# Patient Record
Sex: FEMALE | Race: BLACK OR AFRICAN AMERICAN | NOT HISPANIC OR LATINO | Employment: FULL TIME | URBAN - METROPOLITAN AREA
[De-identification: names, ages, dates, MRNs, and addresses within clinical notes are randomized per-mention and may not be internally consistent; named-entity substitution may affect disease eponyms.]

---

## 2017-09-18 RX ORDER — NORGESTIMATE AND ETHINYL ESTRADIOL 7DAYSX3 28
KIT ORAL
Qty: 28 TABLET | Refills: 5 | Status: SHIPPED | OUTPATIENT
Start: 2017-09-18 | End: 2020-09-02 | Stop reason: SDUPTHER

## 2017-09-20 ENCOUNTER — NURSE TRIAGE (OUTPATIENT)
Dept: ADMINISTRATIVE | Facility: CLINIC | Age: 32
End: 2017-09-20

## 2017-09-20 NOTE — TELEPHONE ENCOUNTER
Reason for Disposition   Caller requesting a NON-URGENT new prescription or refill and triager unable to refill per unit policy    Protocols used: ST MEDICATION QUESTION CALL-A-AH    Pt is wanting a prescription for a something for her vaginal discharge. Advised that she needed to follow up OB/GYN

## 2017-11-28 RX ORDER — AZITHROMYCIN 250 MG/1
TABLET, FILM COATED ORAL
Qty: 6 TABLET | Refills: 0 | Status: SHIPPED | OUTPATIENT
Start: 2017-11-28 | End: 2018-11-05

## 2017-11-28 NOTE — TELEPHONE ENCOUNTER
----- Message from Jessica Guerra sent at 11/28/2017  9:23 AM CST -----  Contact: self   Patient want to know if you can give her a refill on zpack if so please send to [facility], any questions please call back at [phone] (home)       [facility]/pharmacy #[id number] - [place] - [address]  [address]  [place] [address]  Phone: [phone] Fax: [phone]

## 2018-11-05 ENCOUNTER — OFFICE VISIT (OUTPATIENT)
Dept: PRIMARY CARE CLINIC | Facility: CLINIC | Age: 33
End: 2018-11-05
Payer: COMMERCIAL

## 2018-11-05 VITALS
RESPIRATION RATE: 18 BRPM | HEIGHT: 67 IN | SYSTOLIC BLOOD PRESSURE: 125 MMHG | BODY MASS INDEX: 26.68 KG/M2 | OXYGEN SATURATION: 100 % | DIASTOLIC BLOOD PRESSURE: 84 MMHG | WEIGHT: 170 LBS | TEMPERATURE: 99 F | HEART RATE: 86 BPM

## 2018-11-05 DIAGNOSIS — J06.9 UPPER RESPIRATORY TRACT INFECTION, UNSPECIFIED TYPE: Primary | ICD-10-CM

## 2018-11-05 DIAGNOSIS — H61.22 IMPACTED CERUMEN OF LEFT EAR: ICD-10-CM

## 2018-11-05 PROCEDURE — 99213 OFFICE O/P EST LOW 20 MIN: CPT | Mod: S$GLB,,, | Performed by: FAMILY MEDICINE

## 2018-11-05 PROCEDURE — 99999 PR PBB SHADOW E&M-EST. PATIENT-LVL III: CPT | Mod: PBBFAC,,, | Performed by: FAMILY MEDICINE

## 2018-11-05 PROCEDURE — 3008F BODY MASS INDEX DOCD: CPT | Mod: CPTII,S$GLB,, | Performed by: FAMILY MEDICINE

## 2018-11-05 NOTE — PROGRESS NOTES
"Subjective:       Patient ID: Mya Mehta is a 33 y.o. female.    Chief Complaint: Otalgia (left ear pain x4 days ) and Cough (x2 weeks )    URI    This is a new problem. The current episode started 1 to 4 weeks ago (2 weeks). The problem has been gradually improving. There has been no fever. Associated symptoms include congestion, coughing, ear pain, headaches and a sore throat. Pertinent negatives include no rash, vomiting or wheezing.   Otalgia    There is pain in the left ear. This is a new problem. The current episode started in the past 7 days. The problem occurs constantly. The problem has been gradually improving. There has been no fever. Associated symptoms include coughing, headaches and a sore throat. Pertinent negatives include no ear discharge, hearing loss, rash or vomiting. Treatments tried: olive oil. The treatment provided mild relief. There is no history of a chronic ear infection, hearing loss or a tympanostomy tube.     Review of Systems   Constitutional: Negative for fever.   HENT: Positive for congestion, ear pain and sore throat. Negative for ear discharge and hearing loss.    Respiratory: Positive for cough. Negative for wheezing.    Gastrointestinal: Negative for vomiting.   Skin: Negative for rash.   Neurological: Positive for headaches.       Objective:      Vitals:    11/05/18 1013   BP: 125/84   BP Location: Left arm   Patient Position: Sitting   BP Method: Medium (Automatic)   Pulse: 86   Resp: 18   Temp: 98.7 °F (37.1 °C)   TempSrc: Oral   SpO2: 100%   Weight: 77.1 kg (170 lb)   Height: 5' 7" (1.702 m)     Physical Exam   Constitutional: She is oriented to person, place, and time. She appears well-developed and well-nourished.   HENT:   Head: Normocephalic and atraumatic.   Right Ear: Tympanic membrane normal.   Mouth/Throat: Oropharynx is clear and moist and mucous membranes are normal.   Cerumen impaction on left   Eyes: EOM are normal. Pupils are equal, round, and reactive to " light.   Neck: No JVD present.   Cardiovascular: Normal rate, regular rhythm and normal heart sounds.   Pulmonary/Chest: Effort normal and breath sounds normal.   Musculoskeletal: She exhibits no edema.   Neurological: She is alert and oriented to person, place, and time.   Skin: Skin is warm and dry.   Nursing note and vitals reviewed.      Assessment:       1. Upper respiratory tract infection, unspecified type    2. Impacted cerumen of left ear        Plan:       Upper respiratory tract infection, unspecified type  Comments:  Likely viral, treat symptomatically    Impacted cerumen of left ear  Comments:  Ear re-examined after irrigation, normal  Orders:  -     Ear wax removal         Medication List           Accurate as of 11/5/18 12:05 PM. If you have any questions, ask your nurse or doctor.               CONTINUE taking these medications    norgestimate-ethinyl estradiol 0.18/0.215/0.25 mg-35 mcg (28) tablet  Commonly known as:  ORTHO TRI-CYCLEN,TRI-SPRINTEC  TAKE 1 TABLET BY MOUTH EVERY DAY AS DIRECTED        STOP taking these medications    azithromycin 250 MG tablet  Commonly known as:  Z-NAVJOT  Stopped by:  Jaun Sánchez MD

## 2019-02-11 RX ORDER — OSELTAMIVIR PHOSPHATE 75 MG/1
75 CAPSULE ORAL DAILY
Qty: 10 CAPSULE | Refills: 0 | Status: SHIPPED | OUTPATIENT
Start: 2019-02-11 | End: 2019-02-21

## 2019-10-29 ENCOUNTER — OFFICE VISIT (OUTPATIENT)
Dept: PRIMARY CARE CLINIC | Facility: CLINIC | Age: 34
End: 2019-10-29
Payer: COMMERCIAL

## 2019-10-29 ENCOUNTER — CLINICAL SUPPORT (OUTPATIENT)
Dept: PRIMARY CARE CLINIC | Facility: CLINIC | Age: 34
End: 2019-10-29
Payer: COMMERCIAL

## 2019-10-29 VITALS
RESPIRATION RATE: 18 BRPM | SYSTOLIC BLOOD PRESSURE: 118 MMHG | WEIGHT: 168.88 LBS | OXYGEN SATURATION: 99 % | BODY MASS INDEX: 26.51 KG/M2 | DIASTOLIC BLOOD PRESSURE: 74 MMHG | TEMPERATURE: 99 F | HEIGHT: 67 IN | HEART RATE: 72 BPM

## 2019-10-29 DIAGNOSIS — J06.9 VIRAL URI WITH COUGH: Primary | ICD-10-CM

## 2019-10-29 LAB
BILIRUB SERPL-MCNC: ABNORMAL MG/DL
BLOOD URINE, POC: 250
COLOR, POC UA: YELLOW
GLUCOSE UR QL STRIP: ABNORMAL
KETONES UR QL STRIP: ABNORMAL
LEUKOCYTE ESTERASE URINE, POC: ABNORMAL
NITRITE, POC UA: ABNORMAL
PH, POC UA: 5
PROTEIN, POC: ABNORMAL
SPECIFIC GRAVITY, POC UA: 1.02
UROBILINOGEN, POC UA: ABNORMAL

## 2019-10-29 PROCEDURE — 99999 PR PBB SHADOW E&M-EST. PATIENT-LVL IV: CPT | Mod: PBBFAC,,, | Performed by: NURSE PRACTITIONER

## 2019-10-29 PROCEDURE — 99213 PR OFFICE/OUTPT VISIT, EST, LEVL III, 20-29 MIN: ICD-10-PCS | Mod: 25,S$GLB,, | Performed by: NURSE PRACTITIONER

## 2019-10-29 PROCEDURE — 87086 URINE CULTURE/COLONY COUNT: CPT

## 2019-10-29 PROCEDURE — 99999 PR PBB SHADOW E&M-EST. PATIENT-LVL IV: ICD-10-PCS | Mod: PBBFAC,,, | Performed by: NURSE PRACTITIONER

## 2019-10-29 PROCEDURE — 81002 URINALYSIS NONAUTO W/O SCOPE: CPT | Mod: S$GLB,,, | Performed by: NURSE PRACTITIONER

## 2019-10-29 PROCEDURE — 3008F BODY MASS INDEX DOCD: CPT | Mod: CPTII,S$GLB,, | Performed by: NURSE PRACTITIONER

## 2019-10-29 PROCEDURE — 81002 POCT URINE DIPSTICK WITHOUT MICROSCOPE: ICD-10-PCS | Mod: S$GLB,,, | Performed by: NURSE PRACTITIONER

## 2019-10-29 PROCEDURE — 99213 OFFICE O/P EST LOW 20 MIN: CPT | Mod: 25,S$GLB,, | Performed by: NURSE PRACTITIONER

## 2019-10-29 PROCEDURE — 3008F PR BODY MASS INDEX (BMI) DOCUMENTED: ICD-10-PCS | Mod: CPTII,S$GLB,, | Performed by: NURSE PRACTITIONER

## 2019-10-29 RX ORDER — PROMETHAZINE HYDROCHLORIDE AND DEXTROMETHORPHAN HYDROBROMIDE 6.25; 15 MG/5ML; MG/5ML
5 SYRUP ORAL
Qty: 118 ML | Refills: 0 | Status: SHIPPED | OUTPATIENT
Start: 2019-10-29 | End: 2019-11-20

## 2019-10-29 RX ORDER — ESTRADIOL 1 MG/1
1 TABLET ORAL 2 TIMES DAILY
Refills: 1 | COMMUNITY
Start: 2019-09-09 | End: 2020-01-13

## 2019-10-29 NOTE — PROGRESS NOTES
"Chief Complaint  Chief Complaint   Patient presents with    URI    Cough       HPI    Mya Mehta is a 34 y.o. female that presents for body aches.    Reports the onset of body aches approximately 2 days ago. Generalized body aches. Sore throat. No fever or chills. No headache. No n/v/d. Generalized weakness. Nasal congestion. Cough is non-productive. Intermittent "wheezing" at night during coughing spells. Taking halls, tea, theraflu, tylenol cold, mucinex.  Sick contacts including daughter, son, . Everyone was negative for the flu.       PAST MEDICAL HISTORY:  History reviewed. No pertinent past medical history.    PAST SURGICAL HISTORY:  History reviewed. No pertinent surgical history.    SOCIAL HISTORY:  Social History     Socioeconomic History    Marital status: Single     Spouse name: Not on file    Number of children: Not on file    Years of education: Not on file    Highest education level: Not on file   Occupational History    Not on file   Social Needs    Financial resource strain: Not on file    Food insecurity:     Worry: Not on file     Inability: Not on file    Transportation needs:     Medical: Not on file     Non-medical: Not on file   Tobacco Use    Smoking status: Never Smoker    Smokeless tobacco: Never Used   Substance and Sexual Activity    Alcohol use: No     Frequency: Never    Drug use: Not on file    Sexual activity: Not on file   Lifestyle    Physical activity:     Days per week: Not on file     Minutes per session: Not on file    Stress: Not on file   Relationships    Social connections:     Talks on phone: Not on file     Gets together: Not on file     Attends Evangelical service: Not on file     Active member of club or organization: Not on file     Attends meetings of clubs or organizations: Not on file     Relationship status: Not on file   Other Topics Concern    Not on file   Social History Narrative    Not on file       FAMILY HISTORY:  Family History " "  Problem Relation Age of Onset    Cancer Mother        ALLERGIES AND MEDICATIONS: updated and reviewed.  Review of patient's allergies indicates:  No Known Allergies  Current Outpatient Medications   Medication Sig Dispense Refill    estradiol (ESTRACE) 1 MG tablet Take 1 mg by mouth 2 (two) times daily.  1    norgestimate-ethinyl estradiol (ORTHO TRI-CYCLEN,TRI-SPRINTEC) 0.18/0.215/0.25 mg-35 mcg (28) tablet TAKE 1 TABLET BY MOUTH EVERY DAY AS DIRECTED 28 tablet 5    promethazine-dextromethorphan (PROMETHAZINE-DM) 6.25-15 mg/5 mL Syrp Take 5 mLs by mouth every 4 to 6 hours as needed. 118 mL 0     No current facility-administered medications for this visit.          ROS  Review of Systems   Constitutional: Positive for fatigue. Negative for chills and fever.   HENT: Positive for congestion, rhinorrhea and sore throat. Negative for ear discharge, ear pain, postnasal drip, sinus pressure and sinus pain.    Respiratory: Negative for cough, shortness of breath and wheezing.    Cardiovascular: Negative for chest pain and palpitations.   Gastrointestinal: Negative for abdominal pain, diarrhea, nausea and vomiting.   Musculoskeletal: Positive for myalgias.   Skin: Negative for rash.   Neurological: Positive for headaches.   Psychiatric/Behavioral: Positive for sleep disturbance.           PHYSICAL EXAM  Vitals:    10/29/19 1343   BP: 118/74   BP Location: Left arm   Patient Position: Sitting   BP Method: Medium (Manual)   Pulse: 72   Resp: 18   Temp: 98.5 °F (36.9 °C)   TempSrc: Oral   SpO2: 99%   Weight: 76.6 kg (168 lb 14.4 oz)   Height: 5' 7" (1.702 m)    Body mass index is 26.45 kg/m².  Weight: 76.6 kg (168 lb 14.4 oz)   Height: 5' 7" (170.2 cm)     Physical Exam   Constitutional: She appears well-developed and well-nourished.   HENT:   Head: Normocephalic.   Right Ear: Tympanic membrane normal.   Left Ear: Tympanic membrane normal.   Nose: Rhinorrhea present.   Mouth/Throat: Posterior oropharyngeal erythema " present. No tonsillar exudate.   Eyes: Pupils are equal, round, and reactive to light. Conjunctivae are normal.   Neck: Normal range of motion.   Cardiovascular: Normal rate, regular rhythm and normal heart sounds.   Pulses:       Radial pulses are 2+ on the right side, and 2+ on the left side.   Pulmonary/Chest: Effort normal. No accessory muscle usage. No respiratory distress. She has no wheezes. She has no rhonchi.   Abdominal: Soft. Bowel sounds are normal. She exhibits no distension. There is no tenderness.   Musculoskeletal: She exhibits no edema.   Lymphadenopathy:     She has no cervical adenopathy.   Neurological: She is alert.   Skin: Skin is warm and dry. No rash noted.         Health Maintenance       Date Due Completion Date    TETANUS VACCINE 02/20/2002 ---    Pap Smear with HPV Cotest 02/20/2005 ---    Influenza Vaccine (1) 09/01/2019 ---            Assessment & Plan    Problem List Items Addressed This Visit     None      Visit Diagnoses     Viral URI with cough    -  Primary    Relevant Medications    promethazine-dextromethorphan (PROMETHAZINE-DM) 6.25-15 mg/5 mL Syrp    Other Relevant Orders    POCT URINE DIPSTICK WITHOUT MICROSCOPE    X-Ray Chest PA And Lateral    CULTURE, URINE  Patient instructed to start abx with noted worsening of symptoms, the presence of fever, the persistence of symptoms for 8 total days.             Follow-up: No follow-ups on file.    Loretta Varela    Medication List with Changes/Refills   New Medications    PROMETHAZINE-DEXTROMETHORPHAN (PROMETHAZINE-DM) 6.25-15 MG/5 ML SYRP    Take 5 mLs by mouth every 4 to 6 hours as needed.   Current Medications    ESTRADIOL (ESTRACE) 1 MG TABLET    Take 1 mg by mouth 2 (two) times daily.    NORGESTIMATE-ETHINYL ESTRADIOL (ORTHO TRI-CYCLEN,TRI-SPRINTEC) 0.18/0.215/0.25 MG-35 MCG (28) TABLET    TAKE 1 TABLET BY MOUTH EVERY DAY AS DIRECTED

## 2019-10-29 NOTE — PATIENT INSTRUCTIONS
Patient instructed to start abx with noted worsening of symptoms, the presence of fever, the persistence of symptoms for 8 total days.

## 2019-10-31 LAB
BACTERIA UR CULT: NORMAL
BACTERIA UR CULT: NORMAL

## 2019-11-20 ENCOUNTER — OFFICE VISIT (OUTPATIENT)
Dept: PRIMARY CARE CLINIC | Facility: CLINIC | Age: 34
End: 2019-11-20
Payer: COMMERCIAL

## 2019-11-20 VITALS
RESPIRATION RATE: 19 BRPM | HEART RATE: 70 BPM | OXYGEN SATURATION: 100 % | TEMPERATURE: 98 F | SYSTOLIC BLOOD PRESSURE: 112 MMHG | WEIGHT: 167 LBS | DIASTOLIC BLOOD PRESSURE: 78 MMHG | BODY MASS INDEX: 26.21 KG/M2 | HEIGHT: 67 IN

## 2019-11-20 DIAGNOSIS — L73.9 FOLLICULITIS: Primary | ICD-10-CM

## 2019-11-20 PROCEDURE — 99999 PR PBB SHADOW E&M-EST. PATIENT-LVL IV: CPT | Mod: PBBFAC,,, | Performed by: NURSE PRACTITIONER

## 2019-11-20 PROCEDURE — 3008F BODY MASS INDEX DOCD: CPT | Mod: CPTII,S$GLB,, | Performed by: NURSE PRACTITIONER

## 2019-11-20 PROCEDURE — 99213 PR OFFICE/OUTPT VISIT, EST, LEVL III, 20-29 MIN: ICD-10-PCS | Mod: S$GLB,,, | Performed by: NURSE PRACTITIONER

## 2019-11-20 PROCEDURE — 3008F PR BODY MASS INDEX (BMI) DOCUMENTED: ICD-10-PCS | Mod: CPTII,S$GLB,, | Performed by: NURSE PRACTITIONER

## 2019-11-20 PROCEDURE — 99999 PR PBB SHADOW E&M-EST. PATIENT-LVL IV: ICD-10-PCS | Mod: PBBFAC,,, | Performed by: NURSE PRACTITIONER

## 2019-11-20 PROCEDURE — 99213 OFFICE O/P EST LOW 20 MIN: CPT | Mod: S$GLB,,, | Performed by: NURSE PRACTITIONER

## 2019-11-20 RX ORDER — DIAZEPAM 5 MG/1
5 TABLET ORAL EVERY 6 HOURS PRN
Qty: 30 TABLET | Refills: 0 | Status: SHIPPED | OUTPATIENT
Start: 2019-11-20 | End: 2020-01-13

## 2019-11-20 RX ORDER — ALPRAZOLAM 0.5 MG/1
0.5 TABLET ORAL 3 TIMES DAILY
Qty: 30 TABLET | Refills: 0 | Status: CANCELLED | OUTPATIENT
Start: 2019-11-20 | End: 2019-12-20

## 2019-11-20 NOTE — PROGRESS NOTES
Chief Complaint  Chief Complaint   Patient presents with    Follow-up     referral to derm. medication to fly       HPI    Mya Mehta is a 34 y.o. female that presents for rash.    Patient with a h/o abdominal and UE rash for several years. Appeared after the birth of her oldest daughter. Not worsening. Not painful. No itching. Worsening with menstrual period when it becomes a black head. Has seen dermatology in the past with skin biopsy and was told it was a sebaceous cyst and it needed to be burned off but requesting a second opinion.   Patient also reports increased anxiety related to flying and also reports irritability with driving in [place]. Requesting medication for treatment for the anxiety. Has taken valium in the past for dentistry anxiety with full relief.           PAST MEDICAL HISTORY:  History reviewed. No pertinent past medical history.    PAST SURGICAL HISTORY:  History reviewed. No pertinent surgical history.    SOCIAL HISTORY:  Social History     Socioeconomic History    Marital status: Single     Spouse name: Not on file    Number of children: Not on file    Years of education: Not on file    Highest education level: Not on file   Occupational History    Not on file   Social Needs    Financial resource strain: Not on file    Food insecurity:     Worry: Not on file     Inability: Not on file    Transportation needs:     Medical: Not on file     Non-medical: Not on file   Tobacco Use    Smoking status: Never Smoker    Smokeless tobacco: Never Used   Substance and Sexual Activity    Alcohol use: No     Frequency: Never    Drug use: Not on file    Sexual activity: Not on file   Lifestyle    Physical activity:     Days per week: Not on file     Minutes per session: Not on file    Stress: Not on file   Relationships    Social connections:     Talks on phone: Not on file     Gets together: Not on file     Attends Cheondoism service: Not on file     Active member of club or  "organization: Not on file     Attends meetings of clubs or organizations: Not on file     Relationship status: Not on file   Other Topics Concern    Not on file   Social History Narrative    Not on file       FAMILY HISTORY:  Family History   Problem Relation Age of Onset    Cancer Mother        ALLERGIES AND MEDICATIONS: updated and reviewed.  Review of patient's allergies indicates:  No Known Allergies  Current Outpatient Medications   Medication Sig Dispense Refill    estradiol (ESTRACE) 1 MG tablet Take 1 mg by mouth 2 (two) times daily.  1    norgestimate-ethinyl estradiol (ORTHO TRI-CYCLEN,TRI-SPRINTEC) 0.18/0.215/0.25 mg-35 mcg (28) tablet TAKE 1 TABLET BY MOUTH EVERY DAY AS DIRECTED 28 tablet 5    diazePAM (VALIUM) 5 MG tablet Take 1 tablet (5 mg total) by mouth every 6 (six) hours as needed for Anxiety. 30 tablet 0     No current facility-administered medications for this visit.          ROS  Review of Systems   Constitutional: Negative for chills and fever.   HENT: Negative for ear pain, postnasal drip and sinus pain.    Respiratory: Negative for cough and shortness of breath.    Cardiovascular: Negative for chest pain.   Gastrointestinal: Negative for diarrhea, nausea and vomiting.   Skin: Positive for rash.   Psychiatric/Behavioral: The patient is nervous/anxious.            PHYSICAL EXAM  Vitals:    11/20/19 1457   BP: 112/78   BP Location: Right arm   Patient Position: Sitting   BP Method: Medium (Manual)   Pulse: 70   Resp: 19   Temp: 98.1 °F (36.7 °C)   TempSrc: Oral   SpO2: 100%   Weight: 75.8 kg (167 lb)   Height: 5' 7" (1.702 m)    Body mass index is 26.16 kg/m².  Weight: 75.8 kg (167 lb)   Height: 5' 7" (170.2 cm)     Physical Exam   Constitutional: She is oriented to person, place, and time. She appears well-developed and well-nourished.   HENT:   Head: Normocephalic.   Right Ear: Tympanic membrane normal.   Left Ear: Tympanic membrane normal.   Mouth/Throat: Uvula is midline, oropharynx " is clear and moist and mucous membranes are normal.   Eyes: Conjunctivae are normal.   Cardiovascular: Normal rate, regular rhythm, normal heart sounds and normal pulses.   No murmur heard.  Pulses:       Radial pulses are 2+ on the right side, and 2+ on the left side.   No LE swelling noted   Pulmonary/Chest: Effort normal and breath sounds normal. She has no wheezes.   Abdominal: Soft. Bowel sounds are normal. There is no tenderness.   Musculoskeletal: She exhibits no edema.   Lymphadenopathy:     She has no cervical adenopathy.   Neurological: She is alert and oriented to person, place, and time.   Skin: Skin is warm and dry. Rash noted. Rash is pustular.   Generalized rash noted to right abdomen, bilateral upper extremities. Appear pustular, cystic with dark center. No swelling or erythema. Non-vesicular.   Psychiatric: She has a normal mood and affect.         Health Maintenance       Date Due Completion Date    TETANUS VACCINE 02/20/2002 ---    Pap Smear with HPV Cotest 02/20/2005 ---    Influenza Vaccine (1) 09/01/2019 ---            Assessment & Plan    Problem List Items Addressed This Visit     None      Visit Diagnoses     Folliculitis    -  Primary    Relevant Orders    Ambulatory referral to Dermatology          Follow-up: No follow-ups on file.    Loretta Varela    Medication List with Changes/Refills   New Medications    DIAZEPAM (VALIUM) 5 MG TABLET    Take 1 tablet (5 mg total) by mouth every 6 (six) hours as needed for Anxiety.   Current Medications    ESTRADIOL (ESTRACE) 1 MG TABLET    Take 1 mg by mouth 2 (two) times daily.    NORGESTIMATE-ETHINYL ESTRADIOL (ORTHO TRI-CYCLEN,TRI-SPRINTEC) 0.18/0.215/0.25 MG-35 MCG (28) TABLET    TAKE 1 TABLET BY MOUTH EVERY DAY AS DIRECTED   Discontinued Medications    PROMETHAZINE-DEXTROMETHORPHAN (PROMETHAZINE-DM) 6.25-15 MG/5 ML SYRP    Take 5 mLs by mouth every 4 to 6 hours as needed.

## 2020-01-13 ENCOUNTER — OFFICE VISIT (OUTPATIENT)
Dept: PRIMARY CARE CLINIC | Facility: CLINIC | Age: 35
End: 2020-01-13
Payer: COMMERCIAL

## 2020-01-13 VITALS
WEIGHT: 172.63 LBS | BODY MASS INDEX: 27.09 KG/M2 | OXYGEN SATURATION: 100 % | RESPIRATION RATE: 18 BRPM | HEIGHT: 67 IN | HEART RATE: 75 BPM | TEMPERATURE: 98 F | SYSTOLIC BLOOD PRESSURE: 100 MMHG | DIASTOLIC BLOOD PRESSURE: 82 MMHG

## 2020-01-13 DIAGNOSIS — R45.89 INEFFECTIVE COPING: Primary | ICD-10-CM

## 2020-01-13 DIAGNOSIS — F41.9 ANXIETY: ICD-10-CM

## 2020-01-13 PROCEDURE — 99213 PR OFFICE/OUTPT VISIT, EST, LEVL III, 20-29 MIN: ICD-10-PCS | Mod: S$GLB,,, | Performed by: NURSE PRACTITIONER

## 2020-01-13 PROCEDURE — 99999 PR PBB SHADOW E&M-EST. PATIENT-LVL IV: ICD-10-PCS | Mod: PBBFAC,,, | Performed by: NURSE PRACTITIONER

## 2020-01-13 PROCEDURE — 3008F BODY MASS INDEX DOCD: CPT | Mod: CPTII,S$GLB,, | Performed by: NURSE PRACTITIONER

## 2020-01-13 PROCEDURE — 3008F PR BODY MASS INDEX (BMI) DOCUMENTED: ICD-10-PCS | Mod: CPTII,S$GLB,, | Performed by: NURSE PRACTITIONER

## 2020-01-13 PROCEDURE — 99999 PR PBB SHADOW E&M-EST. PATIENT-LVL IV: CPT | Mod: PBBFAC,,, | Performed by: NURSE PRACTITIONER

## 2020-01-13 PROCEDURE — 99213 OFFICE O/P EST LOW 20 MIN: CPT | Mod: S$GLB,,, | Performed by: NURSE PRACTITIONER

## 2020-01-13 NOTE — PROGRESS NOTES
"Chief Complaint  Chief Complaint   Patient presents with    Headache    Stress       HPI    Mya Mehta is a 34 y.o. female that presents for headaches.    Patient with frequent headaches particularly at work. Reports "migraines" while looking at the computer. Also reports increased stressors at work which may be contributing to her headaches. Has been at recent job for 3 years with increased work load in addition to management issues. Feels generally fatigued, up all night doing work and sleeps 4-6 hours per night. Difficulty with initiating sleep when she finally does go to sleep. Episode of anxiety including tremors, headaches, chest tightness at her desk. Increased irritability. Intermittent dysphoria. Anhedonia. No regular exercise regimen. Good support system at home with  and family. No SI/HI. Not currently under the care of a therapist.           PAST MEDICAL HISTORY:  History reviewed. No pertinent past medical history.    PAST SURGICAL HISTORY:  History reviewed. No pertinent surgical history.    SOCIAL HISTORY:  Social History     Socioeconomic History    Marital status: Single     Spouse name: Not on file    Number of children: Not on file    Years of education: Not on file    Highest education level: Not on file   Occupational History    Not on file   Social Needs    Financial resource strain: Not on file    Food insecurity:     Worry: Not on file     Inability: Not on file    Transportation needs:     Medical: Not on file     Non-medical: Not on file   Tobacco Use    Smoking status: Never Smoker    Smokeless tobacco: Never Used   Substance and Sexual Activity    Alcohol use: No     Frequency: Never    Drug use: Not on file    Sexual activity: Not on file   Lifestyle    Physical activity:     Days per week: Not on file     Minutes per session: Not on file    Stress: Not on file   Relationships    Social connections:     Talks on phone: Not on file     Gets together: Not " "on file     Attends Anabaptism service: Not on file     Active member of club or organization: Not on file     Attends meetings of clubs or organizations: Not on file     Relationship status: Not on file   Other Topics Concern    Not on file   Social History Narrative    Not on file       FAMILY HISTORY:  Family History   Problem Relation Age of Onset    Cancer Mother        ALLERGIES AND MEDICATIONS: updated and reviewed.  Review of patient's allergies indicates:  No Known Allergies  Current Outpatient Medications   Medication Sig Dispense Refill    norgestimate-ethinyl estradiol (ORTHO TRI-CYCLEN,TRI-SPRINTEC) 0.18/0.215/0.25 mg-35 mcg (28) tablet TAKE 1 TABLET BY MOUTH EVERY DAY AS DIRECTED 28 tablet 5     No current facility-administered medications for this visit.          ROS  Review of Systems   Constitutional: Negative for chills and fever.   HENT: Negative for ear pain, postnasal drip and sinus pain.    Respiratory: Negative for cough and shortness of breath.    Cardiovascular: Negative for chest pain.   Gastrointestinal: Negative for diarrhea, nausea and vomiting.   Psychiatric/Behavioral: Positive for decreased concentration, dysphoric mood and sleep disturbance. The patient is nervous/anxious.            PHYSICAL EXAM  Vitals:    01/13/20 0832   BP: 100/82   BP Location: Right arm   Patient Position: Sitting   BP Method: Medium (Manual)   Pulse: 75   Resp: 18   Temp: 98.1 °F (36.7 °C)   TempSrc: Oral   SpO2: 100%   Weight: 78.3 kg (172 lb 9.9 oz)   Height: 5' 7" (1.702 m)    Body mass index is 27.04 kg/m².  Weight: 78.3 kg (172 lb 9.9 oz)   Height: 5' 7" (170.2 cm)     Physical Exam   Constitutional: She is oriented to person, place, and time. She appears well-developed and well-nourished.   HENT:   Head: Normocephalic.   Right Ear: Tympanic membrane normal.   Left Ear: Tympanic membrane normal.   Mouth/Throat: Uvula is midline, oropharynx is clear and moist and mucous membranes are normal.   Eyes: " Conjunctivae are normal.   Cardiovascular: Normal rate, regular rhythm, normal heart sounds and normal pulses.   No murmur heard.  Pulses:       Radial pulses are 2+ on the right side, and 2+ on the left side.   No LE swelling noted   Pulmonary/Chest: Effort normal and breath sounds normal. She has no wheezes.   Abdominal: Soft. Bowel sounds are normal. There is no tenderness.   Musculoskeletal: She exhibits no edema.   Lymphadenopathy:     She has no cervical adenopathy.   Neurological: She is alert and oriented to person, place, and time.   Skin: Skin is warm and dry. No rash noted.   Psychiatric: Her mood appears anxious. Her affect is labile.         Health Maintenance       Date Due Completion Date    TETANUS VACCINE 02/20/2002 ---    Pap Smear with HPV Cotest 02/20/2005 ---    Influenza Vaccine (1) 09/01/2019 ---            Assessment & Plan    Problem List Items Addressed This Visit     None      Visit Diagnoses     Ineffective coping    -  Primary    Relevant Orders    Ambulatory referral to Psychology    Anxiety        Relevant Orders    Ambulatory referral to Psychology    Patient educated on techniques to assist in reduction of anxiety including getting adequate sleep and getting regular exercise. Encouraged meditation, deep breathing exercises. Behavioral therapy offered and references given at this time. Coached patient regarding use of SSRI as standard of care in treatment of anxiety. Benzodiazepines to be used sparingly for break through episodes. Follow up in clinic for any worsening or persistent anxiety symptoms.     Stressed the importance of good sleep hygiene in assisting in alleviation of insomnia.  Avoid television, electronic devices for 2 hr prior to bedtime.  Try reading late at night.  No exercising after 3:00 p.m. or in the late evening.  Avoid caffeine after noon.  Good sleep hygiene includes going to sleep at the same time every night and wheezing at the same time every morning.             Follow-up: No follow-ups on file.    Loretta Varela    Medication List with Changes/Refills   Current Medications    NORGESTIMATE-ETHINYL ESTRADIOL (ORTHO TRI-CYCLEN,TRI-SPRINTEC) 0.18/0.215/0.25 MG-35 MCG (28) TABLET    TAKE 1 TABLET BY MOUTH EVERY DAY AS DIRECTED   Discontinued Medications    DIAZEPAM (VALIUM) 5 MG TABLET    Take 1 tablet (5 mg total) by mouth every 6 (six) hours as needed for Anxiety.    ESTRADIOL (ESTRACE) 1 MG TABLET    Take 1 mg by mouth 2 (two) times daily.

## 2020-01-13 NOTE — LETTER
January 13, 2020      [facility]  [address]  [facility] [address]  Phone: [phone]  Fax: [phone]       Patient: Mya Mehta   YOB: 1985  Date of Visit: 01/13/2020    To Whom It May Concern:    Lani Mehta  was at [facility] on 01/13/2020. She may return to work/school on 1/20/2019 with no restrictions. If you have any questions or concerns, or if I can be of further assistance, please do not hesitate to contact me.    Sincerely,          Josseline Hayes MA

## 2020-01-17 ENCOUNTER — TELEPHONE (OUTPATIENT)
Dept: PSYCHOLOGY | Facility: CLINIC | Age: 35
End: 2020-01-17

## 2020-01-17 ENCOUNTER — TELEPHONE (OUTPATIENT)
Dept: PRIMARY CARE CLINIC | Facility: CLINIC | Age: 35
End: 2020-01-17

## 2020-01-17 DIAGNOSIS — Z12.31 ENCOUNTER FOR SCREENING MAMMOGRAM FOR BREAST CANCER: Primary | ICD-10-CM

## 2020-01-17 DIAGNOSIS — Z80.3 FAMILY HISTORY OF BREAST CANCER: ICD-10-CM

## 2020-01-17 NOTE — TELEPHONE ENCOUNTER
----- Message from Mily Riley sent at 1/17/2020  2:19 PM CST -----  Contact: Patient [phone]  Requesting to speak with you regarding her anxiety and also would like an order for her mammogram.    Please call and advise.    Thank You

## 2020-01-17 NOTE — TELEPHONE ENCOUNTER
----- Message from Bryanna Newell sent at 1/17/2020 11:19 AM CST -----  Contact: Pt [phone]  Pt is trying to make an apt please call back

## 2020-01-20 ENCOUNTER — TELEPHONE (OUTPATIENT)
Dept: PRIMARY CARE CLINIC | Facility: CLINIC | Age: 35
End: 2020-01-20

## 2020-01-20 NOTE — LETTER
January 20, 2020      [facility]  [address], [facility] [address]  [facility] [address]  Phone: [phone]  Fax: [phone]       Patient: Mya Mehta   YOB: 1985  Date of Visit: 01/20/2020    To Whom It May Concern:    Lani Mehta  was at [facility] on 01/20/2020. She may return to work/school on 1/21/2020 with no restrictions. If you have any questions or concerns, or if I can be of further assistance, please do not hesitate to contact me.    Sincerely,    Josseline Hayes MA

## 2020-01-22 ENCOUNTER — TELEPHONE (OUTPATIENT)
Dept: PRIMARY CARE CLINIC | Facility: CLINIC | Age: 35
End: 2020-01-22

## 2020-01-22 NOTE — TELEPHONE ENCOUNTER
Attempted to contact patient, Vm left informing patient that mammo was ordered and to return call if she needed something else.

## 2020-01-22 NOTE — TELEPHONE ENCOUNTER
----- Message from Carmen Bañuelos LPN sent at 1/17/2020  2:34 PM CST -----  Contact: Patient [phone]      ----- Message -----  From: Mily Riley  Sent: 1/17/2020   2:19 PM CST  To: [facility] Staff    Requesting to speak with you regarding her anxiety and also would like an order for her mammogram.    Please call and advise.    Thank You

## 2020-01-22 NOTE — TELEPHONE ENCOUNTER
Please inquire as to how the patient is feeling and if she still needs to talk. If so, I will call her this afternoon. I have ordered her mammogram. Please schedule.

## 2020-04-29 ENCOUNTER — OFFICE VISIT (OUTPATIENT)
Dept: PRIMARY CARE CLINIC | Facility: CLINIC | Age: 35
End: 2020-04-29
Payer: COMMERCIAL

## 2020-04-29 DIAGNOSIS — Z91.199 NO-SHOW FOR APPOINTMENT: Primary | ICD-10-CM

## 2020-04-29 PROCEDURE — 99499 UNLISTED E&M SERVICE: CPT | Mod: 95,,, | Performed by: NURSE PRACTITIONER

## 2020-04-29 PROCEDURE — 99499 NO LOS: ICD-10-PCS | Mod: 95,,, | Performed by: NURSE PRACTITIONER

## 2020-04-29 NOTE — PROGRESS NOTES
Chief Complaint  Chief Complaint   Patient presents with    Anxiety       No show           PAST MEDICAL HISTORY:  History reviewed. No pertinent past medical history.    PAST SURGICAL HISTORY:  History reviewed. No pertinent surgical history.    SOCIAL HISTORY:  Social History     Socioeconomic History    Marital status: Single     Spouse name: Not on file    Number of children: Not on file    Years of education: Not on file    Highest education level: Not on file   Occupational History    Not on file   Social Needs    Financial resource strain: Not on file    Food insecurity:     Worry: Not on file     Inability: Not on file    Transportation needs:     Medical: Not on file     Non-medical: Not on file   Tobacco Use    Smoking status: Never Smoker    Smokeless tobacco: Never Used   Substance and Sexual Activity    Alcohol use: No     Frequency: Never    Drug use: Not on file    Sexual activity: Not on file   Lifestyle    Physical activity:     Days per week: Not on file     Minutes per session: Not on file    Stress: Not on file   Relationships    Social connections:     Talks on phone: Not on file     Gets together: Not on file     Attends Yazidi service: Not on file     Active member of club or organization: Not on file     Attends meetings of clubs or organizations: Not on file     Relationship status: Not on file   Other Topics Concern    Not on file   Social History Narrative    Not on file       FAMILY HISTORY:  Family History   Problem Relation Age of Onset    Cancer Mother        ALLERGIES AND MEDICATIONS: updated and reviewed.  Review of patient's allergies indicates:  No Known Allergies  Current Outpatient Medications   Medication Sig Dispense Refill    norgestimate-ethinyl estradiol (ORTHO TRI-CYCLEN,TRI-SPRINTEC) 0.18/0.215/0.25 mg-35 mcg (28) tablet TAKE 1 TABLET BY MOUTH EVERY DAY AS DIRECTED 28 tablet 5     No current facility-administered medications for this visit.           JAVIER  Review of Systems        PHYSICAL EXAM    Physical Exam      Health Maintenance       Date Due Completion Date    TETANUS VACCINE 02/20/2002 ---    Pap Smear with HPV Cotest 02/20/2005 ---    Influenza Vaccine (1) 09/01/2019 ---            Assessment & Plan    Problem List Items Addressed This Visit     None          Follow-up: No follow-ups on file.    Loretta Varela    Medication List with Changes/Refills   Current Medications    NORGESTIMATE-ETHINYL ESTRADIOL (ORTHO TRI-CYCLEN,TRI-SPRINTEC) 0.18/0.215/0.25 MG-35 MCG (28) TABLET    TAKE 1 TABLET BY MOUTH EVERY DAY AS DIRECTED

## 2020-09-02 NOTE — TELEPHONE ENCOUNTER
----- Message from Tabitha Dario sent at 9/2/2020 12:39 PM CDT -----  Contact: Patient  Type:  RX Refill Request    Who Called: Mya, patient  Refill or New Rx:  Refill  RX Name and Strength:  norgestimate-ethinyl estradiol (ORTHO TRI-CYCLEN,TRI-SPRINTEC) 0.18/0.215/0.25 mg-35 mcg (28) tablet  How is the patient currently taking it? (ex. 1XDay):  TAKE 1 TABLET BY MOUTH EVERY DAY AS DIRECTED  Is this a 30 day or 90 day RX:  30  Preferred Pharmacy with phone number:    [facility]/pharmacy #[id number] - [place], [place] - [address]  [address] [address]  Phone: [phone] Fax: [phone]  Local or Mail Order:  Local  Ordering Provider:  Loretta Varela NP  Would the patient rather a call back or a response via [facility]?   Phone call  Best Call Back Number:  [phone]  Additional Information: Please advise. Thanks.

## 2020-09-03 RX ORDER — NORGESTIMATE AND ETHINYL ESTRADIOL 7DAYSX3 28
1 KIT ORAL DAILY
Qty: 28 TABLET | Refills: 5 | Status: SHIPPED | OUTPATIENT
Start: 2020-09-03 | End: 2021-02-12

## 2020-11-05 ENCOUNTER — TELEPHONE (OUTPATIENT)
Dept: PRIMARY CARE CLINIC | Facility: CLINIC | Age: 35
End: 2020-11-05

## 2020-11-05 RX ORDER — DIAZEPAM 5 MG/1
5 TABLET ORAL DAILY PRN
Qty: 5 TABLET | Refills: 0 | Status: SHIPPED | OUTPATIENT
Start: 2020-11-05 | End: 2021-03-24 | Stop reason: SDUPTHER

## 2020-11-05 NOTE — TELEPHONE ENCOUNTER
----- Message from Tabitha Bishop sent at 11/5/2020  8:06 AM CST -----  Contact: Patient, [phone]  Requesting an RX refill or new RX.  Is this a refill or new RX: New  RX name and strength: Valium  Is this a 30 day or 90 day RX: ?  Pharmacy name and phone # (copy/paste from chart):    [id number] - [place], [place] - [address]  [address]  [place] [address] [address]  Phone: [phone] Fax: [phone]  Comments: Asking for a prescription, will be flying on an airplane today at 5:00pm. Please advise. Thanks.

## 2020-11-05 NOTE — TELEPHONE ENCOUNTER
The patient has not been seen in over 6 months.  Generally, I need to see my patients within 3 months for any controlled substances.  I will prescribe 5 Valium for the flight only.  Patient needs to follow-up after returning from her trip.

## 2020-11-17 ENCOUNTER — PATIENT MESSAGE (OUTPATIENT)
Dept: PRIMARY CARE CLINIC | Facility: CLINIC | Age: 35
End: 2020-11-17

## 2020-11-17 DIAGNOSIS — R43.9 PROBLEMS WITH SMELL AND TASTE: ICD-10-CM

## 2020-11-17 DIAGNOSIS — R51.9 HEAD ACHE: ICD-10-CM

## 2020-11-17 DIAGNOSIS — R05.9 COUGH: ICD-10-CM

## 2021-01-05 ENCOUNTER — PATIENT OUTREACH (OUTPATIENT)
Dept: ADMINISTRATIVE | Facility: HOSPITAL | Age: 36
End: 2021-01-05

## 2021-02-12 RX ORDER — NORGESTIMATE AND ETHINYL ESTRADIOL 7DAYSX3 28
KIT ORAL
Qty: 28 TABLET | Refills: 0 | Status: SHIPPED | OUTPATIENT
Start: 2021-02-12 | End: 2021-03-12

## 2021-03-12 RX ORDER — NORGESTIMATE AND ETHINYL ESTRADIOL 7DAYSX3 28
KIT ORAL
Qty: 28 TABLET | Refills: 0 | Status: SHIPPED | OUTPATIENT
Start: 2021-03-12 | End: 2021-03-31 | Stop reason: SDUPTHER

## 2021-03-22 ENCOUNTER — PATIENT OUTREACH (OUTPATIENT)
Dept: ADMINISTRATIVE | Facility: HOSPITAL | Age: 36
End: 2021-03-22

## 2021-03-24 ENCOUNTER — OFFICE VISIT (OUTPATIENT)
Dept: PRIMARY CARE CLINIC | Facility: CLINIC | Age: 36
End: 2021-03-24
Payer: COMMERCIAL

## 2021-03-24 VITALS
DIASTOLIC BLOOD PRESSURE: 84 MMHG | SYSTOLIC BLOOD PRESSURE: 102 MMHG | RESPIRATION RATE: 18 BRPM | BODY MASS INDEX: 27.09 KG/M2 | WEIGHT: 172.63 LBS | HEIGHT: 67 IN | OXYGEN SATURATION: 99 % | HEART RATE: 81 BPM

## 2021-03-24 DIAGNOSIS — Z12.4 ENCOUNTER FOR PAPANICOLAOU SMEAR FOR CERVICAL CANCER SCREENING: ICD-10-CM

## 2021-03-24 DIAGNOSIS — L70.0 CYSTIC ACNE: ICD-10-CM

## 2021-03-24 DIAGNOSIS — Z01.419 WELL WOMAN EXAM WITH ROUTINE GYNECOLOGICAL EXAM: Primary | ICD-10-CM

## 2021-03-24 PROCEDURE — 99999 PR PBB SHADOW E&M-EST. PATIENT-LVL III: ICD-10-PCS | Mod: PBBFAC,,, | Performed by: NURSE PRACTITIONER

## 2021-03-24 PROCEDURE — 1126F AMNT PAIN NOTED NONE PRSNT: CPT | Mod: S$GLB,,, | Performed by: NURSE PRACTITIONER

## 2021-03-24 PROCEDURE — 1126F PR PAIN SEVERITY QUANTIFIED, NO PAIN PRESENT: ICD-10-PCS | Mod: S$GLB,,, | Performed by: NURSE PRACTITIONER

## 2021-03-24 PROCEDURE — 87624 HPV HI-RISK TYP POOLED RSLT: CPT | Performed by: NURSE PRACTITIONER

## 2021-03-24 PROCEDURE — 99395 PR PREVENTIVE VISIT,EST,18-39: ICD-10-PCS | Mod: S$GLB,,, | Performed by: NURSE PRACTITIONER

## 2021-03-24 PROCEDURE — 99395 PREV VISIT EST AGE 18-39: CPT | Mod: S$GLB,,, | Performed by: NURSE PRACTITIONER

## 2021-03-24 PROCEDURE — 3008F PR BODY MASS INDEX (BMI) DOCUMENTED: ICD-10-PCS | Mod: CPTII,S$GLB,, | Performed by: NURSE PRACTITIONER

## 2021-03-24 PROCEDURE — 3008F BODY MASS INDEX DOCD: CPT | Mod: CPTII,S$GLB,, | Performed by: NURSE PRACTITIONER

## 2021-03-24 PROCEDURE — 99999 PR PBB SHADOW E&M-EST. PATIENT-LVL III: CPT | Mod: PBBFAC,,, | Performed by: NURSE PRACTITIONER

## 2021-03-24 RX ORDER — DIAZEPAM 5 MG/1
5 TABLET ORAL DAILY PRN
Qty: 5 TABLET | Refills: 0 | Status: SHIPPED | OUTPATIENT
Start: 2021-03-24 | End: 2021-03-31 | Stop reason: SDUPTHER

## 2021-03-29 ENCOUNTER — PATIENT OUTREACH (OUTPATIENT)
Dept: ADMINISTRATIVE | Facility: OTHER | Age: 36
End: 2021-03-29

## 2021-03-30 ENCOUNTER — TELEPHONE (OUTPATIENT)
Dept: DERMATOLOGY | Facility: CLINIC | Age: 36
End: 2021-03-30

## 2021-03-30 LAB
FINAL PATHOLOGIC DIAGNOSIS: NORMAL
Lab: NORMAL

## 2021-03-31 ENCOUNTER — LAB VISIT (OUTPATIENT)
Dept: LAB | Facility: HOSPITAL | Age: 36
End: 2021-03-31
Attending: DERMATOLOGY
Payer: COMMERCIAL

## 2021-03-31 ENCOUNTER — PATIENT MESSAGE (OUTPATIENT)
Dept: PRIMARY CARE CLINIC | Facility: CLINIC | Age: 36
End: 2021-03-31

## 2021-03-31 ENCOUNTER — OFFICE VISIT (OUTPATIENT)
Dept: DERMATOLOGY | Facility: CLINIC | Age: 36
End: 2021-03-31
Payer: COMMERCIAL

## 2021-03-31 DIAGNOSIS — L70.0 ACNE VULGARIS: Primary | ICD-10-CM

## 2021-03-31 DIAGNOSIS — L72.2 STEATOCYSTOMA MULTIPLEX: ICD-10-CM

## 2021-03-31 DIAGNOSIS — Z51.81 MEDICATION MONITORING ENCOUNTER: ICD-10-CM

## 2021-03-31 LAB
ALBUMIN SERPL BCP-MCNC: 3.2 G/DL (ref 3.5–5.2)
ALP SERPL-CCNC: 66 U/L (ref 55–135)
ALT SERPL W/O P-5'-P-CCNC: 19 U/L (ref 10–44)
AST SERPL-CCNC: 25 U/L (ref 10–40)
BILIRUB DIRECT SERPL-MCNC: 0.2 MG/DL (ref 0.1–0.3)
BILIRUB SERPL-MCNC: 0.3 MG/DL (ref 0.1–1)
CHOLEST SERPL-MCNC: 183 MG/DL (ref 120–199)
CHOLEST/HDLC SERPL: 2.1 {RATIO} (ref 2–5)
HCG INTACT+B SERPL-ACNC: <1.2 MIU/ML
HDLC SERPL-MCNC: 86 MG/DL (ref 40–75)
HDLC SERPL: 47 % (ref 20–50)
HPV HR 12 DNA SPEC QL NAA+PROBE: NEGATIVE
HPV16 AG SPEC QL: NEGATIVE
HPV18 DNA SPEC QL NAA+PROBE: NEGATIVE
LDLC SERPL CALC-MCNC: 81.6 MG/DL (ref 63–159)
NONHDLC SERPL-MCNC: 97 MG/DL
PROT SERPL-MCNC: 6.6 G/DL (ref 6–8.4)
TRIGL SERPL-MCNC: 77 MG/DL (ref 30–150)

## 2021-03-31 PROCEDURE — 99204 OFFICE O/P NEW MOD 45 MIN: CPT | Mod: 25,S$GLB,, | Performed by: DERMATOLOGY

## 2021-03-31 PROCEDURE — 99204 PR OFFICE/OUTPT VISIT, NEW, LEVL IV, 45-59 MIN: ICD-10-PCS | Mod: 25,S$GLB,, | Performed by: DERMATOLOGY

## 2021-03-31 PROCEDURE — 84702 CHORIONIC GONADOTROPIN TEST: CPT | Performed by: DERMATOLOGY

## 2021-03-31 PROCEDURE — 10060 PR DRAIN SKIN ABSCESS SIMPLE: ICD-10-PCS | Mod: S$GLB,,, | Performed by: DERMATOLOGY

## 2021-03-31 PROCEDURE — 1126F AMNT PAIN NOTED NONE PRSNT: CPT | Mod: S$GLB,,, | Performed by: DERMATOLOGY

## 2021-03-31 PROCEDURE — 10060 I&D ABSCESS SIMPLE/SINGLE: CPT | Mod: S$GLB,,, | Performed by: DERMATOLOGY

## 2021-03-31 PROCEDURE — 99999 PR PBB SHADOW E&M-EST. PATIENT-LVL III: CPT | Mod: PBBFAC,,, | Performed by: DERMATOLOGY

## 2021-03-31 PROCEDURE — 80076 HEPATIC FUNCTION PANEL: CPT | Performed by: DERMATOLOGY

## 2021-03-31 PROCEDURE — 80061 LIPID PANEL: CPT | Performed by: DERMATOLOGY

## 2021-03-31 PROCEDURE — 1126F PR PAIN SEVERITY QUANTIFIED, NO PAIN PRESENT: ICD-10-PCS | Mod: S$GLB,,, | Performed by: DERMATOLOGY

## 2021-03-31 PROCEDURE — 99999 PR PBB SHADOW E&M-EST. PATIENT-LVL III: ICD-10-PCS | Mod: PBBFAC,,, | Performed by: DERMATOLOGY

## 2021-03-31 PROCEDURE — 36415 COLL VENOUS BLD VENIPUNCTURE: CPT | Performed by: DERMATOLOGY

## 2021-03-31 RX ORDER — ADAPALENE GEL USP, 0.3% 3 MG/G
GEL TOPICAL NIGHTLY
Qty: 45 G | Refills: 5 | Status: SHIPPED | OUTPATIENT
Start: 2021-03-31

## 2021-03-31 RX ORDER — DIAZEPAM 5 MG/1
5 TABLET ORAL DAILY PRN
Qty: 5 TABLET | Refills: 0 | Status: SHIPPED | OUTPATIENT
Start: 2021-03-31 | End: 2022-06-30 | Stop reason: SDUPTHER

## 2021-03-31 RX ORDER — NORGESTIMATE AND ETHINYL ESTRADIOL 7DAYSX3 28
1 KIT ORAL DAILY
Qty: 28 TABLET | Refills: 11 | Status: SHIPPED | OUTPATIENT
Start: 2021-03-31 | End: 2022-01-03

## 2021-04-13 ENCOUNTER — PATIENT MESSAGE (OUTPATIENT)
Dept: PRIMARY CARE CLINIC | Facility: CLINIC | Age: 36
End: 2021-04-13

## 2021-04-16 ENCOUNTER — PATIENT MESSAGE (OUTPATIENT)
Dept: RESEARCH | Facility: HOSPITAL | Age: 36
End: 2021-04-16

## 2021-04-27 ENCOUNTER — TELEPHONE (OUTPATIENT)
Dept: PRIMARY CARE CLINIC | Facility: CLINIC | Age: 36
End: 2021-04-27

## 2021-04-27 DIAGNOSIS — Z20.822 ENCOUNTER FOR LABORATORY TESTING FOR COVID-19 VIRUS: ICD-10-CM

## 2021-11-30 DIAGNOSIS — J02.0 STREP THROAT: Primary | ICD-10-CM

## 2021-11-30 RX ORDER — CEFDINIR 300 MG/1
300 CAPSULE ORAL 2 TIMES DAILY
Qty: 10 CAPSULE | Refills: 0 | Status: SHIPPED | OUTPATIENT
Start: 2021-11-30 | End: 2021-12-05

## 2022-02-21 ENCOUNTER — TELEPHONE (OUTPATIENT)
Dept: DERMATOLOGY | Facility: CLINIC | Age: 37
End: 2022-02-21
Payer: COMMERCIAL

## 2022-04-20 ENCOUNTER — TELEPHONE (OUTPATIENT)
Dept: PRIMARY CARE CLINIC | Facility: CLINIC | Age: 37
End: 2022-04-20
Payer: COMMERCIAL

## 2022-04-20 RX ORDER — FLUCONAZOLE 150 MG/1
150 TABLET ORAL DAILY
Qty: 1 TABLET | Refills: 0 | Status: SHIPPED | OUTPATIENT
Start: 2022-04-20 | End: 2022-04-21

## 2022-05-09 ENCOUNTER — TELEPHONE (OUTPATIENT)
Dept: PRIMARY CARE CLINIC | Facility: CLINIC | Age: 37
End: 2022-05-09
Payer: COMMERCIAL

## 2022-05-09 NOTE — TELEPHONE ENCOUNTER
----- Message from Suman Mckeon sent at 5/9/2022  2:24 PM CDT -----  Contact: pt  Patient is returning a phone call.  Who left a message for the patient: Columba Franco  Does patient know what this is regarding:  yes  Would you like a call back, or a response through your [facility] portal?:  call  Comments:

## 2022-05-09 NOTE — TELEPHONE ENCOUNTER
----- Message from Tabitha Bishop sent at 5/9/2022 10:37 AM CDT -----  Contact: Patient, [phone]  Calling to speak with the nurse regarding her appointment she missed this morning. Please call her. Thanks.

## 2022-05-11 ENCOUNTER — OFFICE VISIT (OUTPATIENT)
Dept: PRIMARY CARE CLINIC | Facility: CLINIC | Age: 37
End: 2022-05-11
Payer: COMMERCIAL

## 2022-05-11 VITALS
OXYGEN SATURATION: 99 % | WEIGHT: 161.06 LBS | HEART RATE: 89 BPM | BODY MASS INDEX: 25.28 KG/M2 | RESPIRATION RATE: 16 BRPM | SYSTOLIC BLOOD PRESSURE: 104 MMHG | HEIGHT: 67 IN | DIASTOLIC BLOOD PRESSURE: 68 MMHG | TEMPERATURE: 98 F

## 2022-05-11 DIAGNOSIS — N76.0 ACUTE VAGINITIS: Primary | ICD-10-CM

## 2022-05-11 PROCEDURE — 99213 PR OFFICE/OUTPT VISIT, EST, LEVL III, 20-29 MIN: ICD-10-PCS | Mod: S$GLB,,, | Performed by: NURSE PRACTITIONER

## 2022-05-11 PROCEDURE — 3078F PR MOST RECENT DIASTOLIC BLOOD PRESSURE < 80 MM HG: ICD-10-PCS | Mod: CPTII,S$GLB,, | Performed by: NURSE PRACTITIONER

## 2022-05-11 PROCEDURE — 99999 PR PBB SHADOW E&M-EST. PATIENT-LVL III: CPT | Mod: PBBFAC,,, | Performed by: NURSE PRACTITIONER

## 2022-05-11 PROCEDURE — 3008F BODY MASS INDEX DOCD: CPT | Mod: CPTII,S$GLB,, | Performed by: NURSE PRACTITIONER

## 2022-05-11 PROCEDURE — 3008F PR BODY MASS INDEX (BMI) DOCUMENTED: ICD-10-PCS | Mod: CPTII,S$GLB,, | Performed by: NURSE PRACTITIONER

## 2022-05-11 PROCEDURE — 3074F SYST BP LT 130 MM HG: CPT | Mod: CPTII,S$GLB,, | Performed by: NURSE PRACTITIONER

## 2022-05-11 PROCEDURE — 99213 OFFICE O/P EST LOW 20 MIN: CPT | Mod: S$GLB,,, | Performed by: NURSE PRACTITIONER

## 2022-05-11 PROCEDURE — 3074F PR MOST RECENT SYSTOLIC BLOOD PRESSURE < 130 MM HG: ICD-10-PCS | Mod: CPTII,S$GLB,, | Performed by: NURSE PRACTITIONER

## 2022-05-11 PROCEDURE — 3078F DIAST BP <80 MM HG: CPT | Mod: CPTII,S$GLB,, | Performed by: NURSE PRACTITIONER

## 2022-05-11 PROCEDURE — 99999 PR PBB SHADOW E&M-EST. PATIENT-LVL III: ICD-10-PCS | Mod: PBBFAC,,, | Performed by: NURSE PRACTITIONER

## 2022-05-11 RX ORDER — FLUCONAZOLE 150 MG/1
150 TABLET ORAL
Qty: 2 TABLET | Refills: 0 | Status: SHIPPED | OUTPATIENT
Start: 2022-05-11 | End: 2022-05-15

## 2022-05-11 NOTE — PROGRESS NOTES
Chief Complaint  Chief Complaint   Patient presents with    Vaginitis       HPI    Patient with a h/o recurrent bacterial vaginosis and recurrent yeast vaginitis reports that she got up a brazilian bikini wax recently and exfoliated with an elderberry scrub after the wax at the recommendation of the  as a result she has suffered from an itchy, erythematous, dry rash on the outside her vagina bilateral vulva.  No vaginal discharge.  No vaginal itching.  Only external itching has been at treating with Monistat intravaginally for proximally 2 days which has helped.  Patient with history of recurrent yeast vaginitis in the past x2 episodes.  Previously result to the use of Diflucan.  Also history of bacterial vaginosis has been chronically on metronidazole gel after her menstrual period.  Denies any vaginal discharge at this time.  No recent episodes of bacterial vaginosis.  Patient does not douche.  No vaginal washes.  Does take hot baths and uses bubble baths and explore way ends.  Works out daily.  She showers immediately after working out.  No history of diabetes.        PAST MEDICAL HISTORY:  No past medical history on file.    PAST SURGICAL HISTORY:  No past surgical history on file.    SOCIAL HISTORY:  Social History     Socioeconomic History    Marital status: Single   Tobacco Use    Smoking status: Never Smoker    Smokeless tobacco: Never Used   Substance and Sexual Activity    Alcohol use: No       FAMILY HISTORY:  Family History   Problem Relation Age of Onset    Cancer Mother        ALLERGIES AND MEDICATIONS: updated and reviewed.  Review of patient's allergies indicates:  No Known Allergies  Current Outpatient Medications   Medication Sig Dispense Refill    adapalene 0.3 % gel Apply topically every evening. Apply thin layer to all affected areas nightly. Start slow, up titrate as tolerated. 45 g 5    diazePAM (VALIUM) 5 MG tablet Take 1 tablet (5 mg total) by mouth daily as needed for  "Anxiety. 5 tablet 0    fluconazole (DIFLUCAN) 150 MG Tab Take 1 tablet (150 mg total) by mouth Every 3 (three) days. for 2 doses 2 tablet 0     No current facility-administered medications for this visit.         ROS  Review of Systems   Constitutional: Negative for chills and fever.   HENT: Negative for ear pain, postnasal drip and sinus pain.    Respiratory: Negative for cough and shortness of breath.    Cardiovascular: Negative for chest pain.   Gastrointestinal: Negative for diarrhea, nausea and vomiting.   Genitourinary: Positive for vaginal pain (Vaginal itching). Negative for vaginal discharge.   Skin: Positive for rash (Vulvar rash).           PHYSICAL EXAM  Vitals:    05/11/22 1532   BP: 104/68   BP Location: Left arm   Patient Position: Sitting   BP Method: Medium (Manual)   Pulse: 89   Resp: 16   Temp: 98.2 °F (36.8 °C)   TempSrc: Oral   SpO2: 99%   Weight: 73.1 kg (161 lb 0.7 oz)   Height: 5' 7" (1.702 m)    Body mass index is 25.22 kg/m².  Weight: 73.1 kg (161 lb 0.7 oz)   Height: 5' 7" (170.2 cm)     Physical Exam  Constitutional:       Appearance: She is well-developed.   HENT:      Head: Normocephalic.      Right Ear: Tympanic membrane normal.      Left Ear: Tympanic membrane normal.      Mouth/Throat:      Pharynx: Uvula midline.   Eyes:      Conjunctiva/sclera: Conjunctivae normal.   Cardiovascular:      Rate and Rhythm: Normal rate and regular rhythm.      Pulses: Normal pulses.           Radial pulses are 2+ on the right side and 2+ on the left side.      Heart sounds: Normal heart sounds. No murmur heard.     Comments: No LE swelling noted  Pulmonary:      Effort: Pulmonary effort is normal.      Breath sounds: Normal breath sounds. No wheezing.   Abdominal:      General: Bowel sounds are normal.      Palpations: Abdomen is soft.      Tenderness: There is no abdominal tenderness.   Genitourinary:     Comments: Deferred at patient request.  Lymphadenopathy:      Cervical: No cervical adenopathy. "   Skin:     General: Skin is warm and dry.      Findings: No rash.   Neurological:      Mental Status: She is alert and oriented to person, place, and time.           Health Maintenance       Date Due Completion Date    COVID-19 Vaccine (2 - Booster for Pierre series) 06/05/2021 4/10/2021    TETANUS VACCINE 05/11/2023 (Originally 2/20/2002) ---    Influenza Vaccine (Season Ended) 09/01/2022 ---    Cervical Cancer Screening 03/24/2026 3/24/2021            Assessment & Plan    Problem List Items Addressed This Visit    None     Visit Diagnoses     Acute vaginitis    -  Primary    Relevant Medications    fluconazole (DIFLUCAN) 150 MG Tab          Follow-up: Follow up if symptoms worsen or fail to improve.    Loretta Varela    Medication List with Changes/Refills   New Medications    FLUCONAZOLE (DIFLUCAN) 150 MG TAB    Take 1 tablet (150 mg total) by mouth Every 3 (three) days. for 2 doses   Current Medications    ADAPALENE 0.3 % GEL    Apply topically every evening. Apply thin layer to all affected areas nightly. Start slow, up titrate as tolerated.    DIAZEPAM (VALIUM) 5 MG TABLET    Take 1 tablet (5 mg total) by mouth daily as needed for Anxiety.   Discontinued Medications    NORGESTIMATE-ETHINYL ESTRADIOL (ORTHO TRI-CYCLEN,TRI-SPRINTEC) 0.18/0.215/0.25 MG-35 MCG (28) TABLET    TAKE 1 TABLET BY MOUTH EVERY DAY

## 2022-05-16 RX ORDER — METRONIDAZOLE 7.5 MG/G
1 GEL VAGINAL DAILY
Qty: 70 G | Refills: 0 | Status: SHIPPED | OUTPATIENT
Start: 2022-05-16 | End: 2023-03-06

## 2022-05-16 NOTE — TELEPHONE ENCOUNTER
----- Message from Columba Franco LPN sent at 5/16/2022  4:35 PM CDT -----  Contact: Patient,  [phone]    ----- Message -----  From: Tabitha Bishop  Sent: 5/16/2022   3:17 PM CDT  To: [facility] Staff    Requesting an RX refill or new RX.  Is this a refill or new RX: New  RX name and strength (copy/paste from chart):  Metronidazole vaginal gel  Is this a 30 day or 90 day RX:   Pharmacy name and phone # (copy/paste from chart):    [id number] [place], [place] - [address]  [address] [place] [address]  Phone: [phone] Fax: [phone]  The doctors have asked that we provide their patients with the following 2 reminders -- prescription refills can take up to 72 hours, and a friendly reminder that in the future you can use your [facility] account to request refills: Yes

## 2022-10-31 ENCOUNTER — OFFICE VISIT (OUTPATIENT)
Dept: PRIMARY CARE CLINIC | Facility: CLINIC | Age: 37
End: 2022-10-31
Payer: COMMERCIAL

## 2022-10-31 ENCOUNTER — TELEPHONE (OUTPATIENT)
Dept: PRIMARY CARE CLINIC | Facility: CLINIC | Age: 37
End: 2022-10-31
Payer: COMMERCIAL

## 2022-10-31 DIAGNOSIS — K00.9 DENTAL ANOMALY: ICD-10-CM

## 2022-10-31 DIAGNOSIS — F41.9 ANXIETY: Primary | ICD-10-CM

## 2022-10-31 PROCEDURE — 99213 PR OFFICE/OUTPT VISIT, EST, LEVL III, 20-29 MIN: ICD-10-PCS | Mod: 95,,, | Performed by: FAMILY MEDICINE

## 2022-10-31 PROCEDURE — 99213 OFFICE O/P EST LOW 20 MIN: CPT | Mod: 95,,, | Performed by: FAMILY MEDICINE

## 2022-10-31 RX ORDER — DIAZEPAM 5 MG/1
5 TABLET ORAL DAILY PRN
Qty: 30 TABLET | Refills: 2 | Status: SHIPPED | OUTPATIENT
Start: 2022-10-31 | End: 2024-03-12 | Stop reason: SDUPTHER

## 2022-10-31 NOTE — TELEPHONE ENCOUNTER
----- Message from Muna Ureña sent at 10/31/2022  9:43 AM CDT -----  Contact: [phone] Patient  Pt would like it filled ASAP due to her flying out tonight at 6pm.         Requesting an RX refill or new RX.  Is this a refill or new RX: new  RX name and strength (copy/paste from chart):  diazePAM (VALIUM) 5 MG tablet  Is this a 30 day or 90 day RX:   Pharmacy name and phone # (copy/paste from chart):    [facility]/pharmacy #[id number] - [place], [place] - [address]  [address]  [place] [address]  Phone: [phone] Fax: [phone]

## 2022-10-31 NOTE — PROGRESS NOTES
Subjective:       Patient ID: Mya Mehta is a 37 y.o. female.    Chief Complaint: No chief complaint on file.    HPI:  37-year-old black female in for medication refills--needs refill of Valium--getting ready to fly in an airplane--[place] --get done. Venires for whitening and straightening her teeth.  Usually gets refill of nerve medication when she is flying or seeing the dentist and this case  she is doing both.  Eating well--+BM--ambulating well    ROS:  Skin: no psoriasis, eczema, skin cancer  HEENT: No headache, ocular pain, blurred vision, diplopia, epistaxis, hoarseness change in voice, thyroid trouble  Lung: No pneumonia, asthma, Tb, wheezing, SOB, no smoke  Heart: No chest pain, ankle edema, palpitations, MI, jenny murmur, hypertension, hyperlipidemia  Abdomen: No nausea, vomiting, diarrhea, constipation, ulcers, hepatitis, gallbladder disease, melena, hematochezia, hematemesis  : no UTI, renal disease, stones  GYN LMP --10/27/2022  MS: no fractures, O/A, lupus, rheumatoid, gout  Neuro: No dizziness, LOC, seizures   No diabetes, no anemia, no anxiety, no depression   Single 2 children --work home  lives with 2 chills      Objective:   Physical Exam:  No physical exam was done this was an audio visit  General: Well nourished, well developed, no acute distress  Skin: No lesions  HEENT: Eyes PERRLA, EOM intact, nose patent, throat non-erythematous   NECK: Supple, no bruits, No JVD, no nodes  Lungs: Clear, no rales, rhonchi, wheezing  Heart: Regular rate and rhythm, no murmurs, gallops, or rubs  Abdomen: flat, bowel sounds positive, no tenderness, or organomegaly  MS: Range of motion and muscle strength intact  Neuro: Alert, CN intact, oriented X 3  Extremities: No cyanosis, clubbing, or edema         Assessment:       1. Anxiety    2. Dental anomaly        Plan:       Anxiety    Dental anomaly    Other orders  -     diazePAM (VALIUM) 5 MG tablet; Take 1 tablet (5 mg total) by  mouth daily as needed for Anxiety.  Dispense: 30 tablet; Refill: 2      Established Patient - Audio Only Telehealth Visit     The patient location is:  Home  The chief complaint leading to consultation is:  Anxiety flying going to dentist dental issues  Visit type: Virtual visit with audio only (telephone)  Total time spent with patient:  30 minute       The reason for the audio only service rather than synchronous audio and video virtual visit was related to technical difficulties or patient preference/necessity.     Each patient to whom I provide medical services by telemedicine is:  (1) informed of the relationship between the physician and patient and the respective role of any other health care provider with respect to management of the patient; and (2) notified that they may decline to receive medical services by telemedicine and may withdraw from such care at any time. Patient verbally consented to receive this service via voice-only telephone call.       HPI:  See history of present illness above     Assessment and plan:  See plan above                        This service was not originating from a related E/M service provided within the previous 7 days nor will  to an E/M service or procedure within the next 24 hours or my soonest available appointment.  Prevailing standard of care was able to be met in this audio-only visit.

## 2022-10-31 NOTE — TELEPHONE ENCOUNTER
I let the patient know she will need an appointment. I was able to squeeze her in this evening for an audio with Dr. Gabriel

## 2023-03-07 ENCOUNTER — PATIENT MESSAGE (OUTPATIENT)
Dept: PRIMARY CARE CLINIC | Facility: CLINIC | Age: 38
End: 2023-03-07

## 2023-03-08 RX ORDER — FLUCONAZOLE 150 MG/1
150 TABLET ORAL
Qty: 2 TABLET | Refills: 0 | Status: SHIPPED | OUTPATIENT
Start: 2023-03-08 | End: 2023-03-12

## 2023-03-09 ENCOUNTER — PATIENT MESSAGE (OUTPATIENT)
Dept: PRIMARY CARE CLINIC | Facility: CLINIC | Age: 38
End: 2023-03-09

## 2023-03-29 ENCOUNTER — TELEPHONE (OUTPATIENT)
Dept: PRIMARY CARE CLINIC | Facility: CLINIC | Age: 38
End: 2023-03-29

## 2023-03-29 NOTE — TELEPHONE ENCOUNTER
Returned call to patient in regards to message. I informed patient since she have trouble breathing she needs to go to ED or Urgent care in regards to her symptoms. Patient stated that she wanted a virtual visited I explained to patient that she needed to be seen giving the provider would need to listen to her lungs and assess the patient in person. Patient said that she understand. She said that she will go to ED or Urgent care.

## 2023-03-29 NOTE — TELEPHONE ENCOUNTER
----- Message from Kristin Mccall sent at 3/29/2023 12:42 PM CDT -----  Contact: [phone]  Patient is calling for Medical Advice regarding:Patient was exposed to covid over a week ago, however test was negative and symptoms are coughing, , congested and heavy breathing, please call and advise.     How long has patient had these symptoms: over a week    Pharmacy name and phone#:[facility]/pharmacy #[id number] - [place] - [address]   Phone:  [phone]  Fax:  [phone]      Comments:

## 2023-04-13 ENCOUNTER — PATIENT MESSAGE (OUTPATIENT)
Dept: PRIMARY CARE CLINIC | Facility: CLINIC | Age: 38
End: 2023-04-13

## 2023-05-05 ENCOUNTER — TELEPHONE (OUTPATIENT)
Dept: PRIMARY CARE CLINIC | Facility: CLINIC | Age: 38
End: 2023-05-05

## 2023-05-05 NOTE — TELEPHONE ENCOUNTER
Called patient in regards to message. Patient was calling for a virtual appointment with  I informed patient that  is out of the office today. I informed patient to go to Urgent care to get treated for symptoms. Patient verbalized understand.

## 2023-05-05 NOTE — TELEPHONE ENCOUNTER
----- Message from Kyree Whitehead sent at 5/5/2023 11:06 AM CDT -----  Contact: [phone]  Pt said she has sore throat cough and think it's a sinus infection. Please call pt back.

## 2023-05-05 NOTE — TELEPHONE ENCOUNTER
----- Message from Kyree Whitehead sent at 5/5/2023 11:41 AM CDT -----  Contact: [phone]  Pt missed a call and would like a call back.

## 2023-05-25 ENCOUNTER — TELEPHONE (OUTPATIENT)
Dept: PRIMARY CARE CLINIC | Facility: CLINIC | Age: 38
End: 2023-05-25

## 2023-05-25 DIAGNOSIS — K64.9 HEMORRHOIDS, UNSPECIFIED HEMORRHOID TYPE: Primary | ICD-10-CM

## 2023-05-25 NOTE — TELEPHONE ENCOUNTER
----- Message from Kyree Whitehead sent at 5/25/2023  2:54 PM CDT -----  Contact: [phone]  Pt said she needs a call back about getting a referral for gastro. Please call pt back.

## 2023-05-26 NOTE — TELEPHONE ENCOUNTER
Called patient and asked she said that she have hemorrhoids and the gyn provider informed her to ask her pcp to put in a referral for gastroenterology.

## 2023-09-18 ENCOUNTER — PATIENT MESSAGE (OUTPATIENT)
Dept: PRIMARY CARE CLINIC | Facility: CLINIC | Age: 38
End: 2023-09-18

## 2023-10-18 ENCOUNTER — PATIENT MESSAGE (OUTPATIENT)
Dept: CARDIOLOGY | Facility: CLINIC | Age: 38
End: 2023-10-18

## 2023-12-14 ENCOUNTER — TELEPHONE (OUTPATIENT)
Dept: PRIMARY CARE CLINIC | Facility: CLINIC | Age: 38
End: 2023-12-14

## 2023-12-14 NOTE — TELEPHONE ENCOUNTER
----- Message from Carmen Plasencia sent at 12/14/2023  2:16 PM CST -----  Contact: [phone]  Patient called, reqeusted a courtesy call from SHARON Varela about if is possible for her to sent her the rx to [facility] - patient did not have the information for the pharmacy, but want to inform that she is going to [place] because family problems, and she needs her diazePAM (VALIUM) 5 MG tablet, if she can get a call today.  Thank you.

## 2023-12-15 NOTE — TELEPHONE ENCOUNTER
Patient hasn't been seen in over a year, and controlled substance prescription cannot be sent out of state.

## 2024-03-12 ENCOUNTER — OFFICE VISIT (OUTPATIENT)
Dept: PRIMARY CARE CLINIC | Facility: CLINIC | Age: 39
End: 2024-03-12
Payer: COMMERCIAL

## 2024-03-12 DIAGNOSIS — F41.9 ANXIETY: Primary | ICD-10-CM

## 2024-03-12 DIAGNOSIS — L72.3 SEBACEOUS CYST: ICD-10-CM

## 2024-03-12 DIAGNOSIS — K00.9 DENTAL ANOMALY: ICD-10-CM

## 2024-03-12 PROCEDURE — 99213 OFFICE O/P EST LOW 20 MIN: CPT | Mod: 95,,, | Performed by: FAMILY MEDICINE

## 2024-03-12 RX ORDER — DIAZEPAM 5 MG/1
TABLET ORAL
Qty: 30 TABLET | Refills: 5 | Status: SHIPPED | OUTPATIENT
Start: 2024-03-12

## 2024-03-12 NOTE — PROGRESS NOTES
Subjective:       Patient ID: Mya Mehta is a 39 y.o. female.    Chief Complaint: No chief complaint on file.      HPI: Virtual Video Telehealth Visit     The patient location is:  Home  The chief complaint leading to consultation is:  Anxiety/dental issues/pacer cyst  Visit type: Virtual visit  Total time spent with patient:  20 minutes     Each patient to whom I provide medical services by telemedicine is:  (1) informed of the relationship between the physician and patient and the respective role of any other health care provider with respect to management of the patient; and (2) notified that they may decline to receive medical services by telemedicine and may withdraw from such care at any time. Patient verbally consented to receive this service via voice-only telephone call.       HPI:  See history of present illness above     Assessment and plan:  See plan below 40 yo BF in for medication refills Valium --patient about to travel--recently laid off from work---dental visits---  doing well--5 children--1 at home 11 years old doing---family--older sister recently had brain aneurysm---in recovery---in law--doing---financial just laid off--sex--good--he had vasectomy      ROS:  Skin: no psoriasis, eczema, skin cancer--history of sebaceous cyst on Retin-A  HEENT: No headache, ocular pain, blurred vision, diplopia, epistaxis, hoarseness change in voice, thyroid trouble  Lung: No pneumonia, asthma, Tb, wheezing, SOB, no smoke  Heart: No chest pain, ankle edema, palpitations, MI, jenny murmur, hypertension, hyperlipidemia--no stent bypass arrhythmia  Abdomen: No nausea, vomiting, diarrhea, constipation, ulcers, hepatitis, gallbladder disease, melena, hematochezia, hematemesis  : no UTI, renal disease, stones  GYN LMP --2 weeks ago  MS: no fractures, O/A, lupus, rheumatoid, gout  Neuro: No dizziness, LOC, seizures   No diabetes, no anemia, no anxiety, no depression    5 children --work =just  laid off lives with  and 1 child      Objective:   Physical Exam:  No physical exam was done this was an audio visit  General: Well nourished, well developed, no acute distress  Skin: No lesions  HEENT: Eyes PERRLA, EOM intact, nose patent, throat non-erythematous   NECK: Supple, no bruits, No JVD, no nodes  Lungs: Clear, no rales, rhonchi, wheezing  Heart: Regular rate and rhythm, no murmurs, gallops, or rubs  Abdomen: flat, bowel sounds positive, no tenderness, or organomegaly  MS: Range of motion and muscle strength intact  Neuro: Alert, CN intact, oriented X 3  Extremities: No cyanosis, clubbing, or edema         Assessment:       1. Anxiety    2. Dental anomaly    3. Sebaceous cyst          Plan:       Anxiety  -     CBC Auto Differential; Future; Expected date: 09/12/2024  -     Comprehensive Metabolic Panel; Future; Expected date: 09/12/2024  -     Lipid Panel; Future; Expected date: 09/12/2024  -     TSH; Future; Expected date: 09/12/2024  -     T4, Free; Future; Expected date: 09/12/2024    Dental anomaly    Sebaceous cyst    Other orders  -     diazePAM (VALIUM) 5 MG tablet; 1 po q.d. p.r.n. anxiety  Dispense: 30 tablet; Refill: 5              Main Reason for Visit  Anxiety--needs refill of Valium--patient takes 1 flies in an airplane/goes to dental office/recently laid off/sister with recent aneurysms--finances or little tighter--has 5 children but 1 at home  Valium 5 mg number 30 with 5 refills 1 p.o. q.d. p.r.n. anxiety  History of sebaceous cyst wants refill of Retin-A to have pharmacies send reques  Lab CBCs CMP lipids T4 TSH in 6 months

## 2024-03-14 DIAGNOSIS — Z12.31 OTHER SCREENING MAMMOGRAM: ICD-10-CM

## 2024-03-18 ENCOUNTER — PATIENT MESSAGE (OUTPATIENT)
Dept: ADMINISTRATIVE | Facility: HOSPITAL | Age: 39
End: 2024-03-18
Payer: COMMERCIAL

## 2024-03-20 ENCOUNTER — TELEPHONE (OUTPATIENT)
Dept: PRIMARY CARE CLINIC | Facility: CLINIC | Age: 39
End: 2024-03-20
Payer: COMMERCIAL

## 2024-03-20 ENCOUNTER — PATIENT MESSAGE (OUTPATIENT)
Dept: PRIMARY CARE CLINIC | Facility: CLINIC | Age: 39
End: 2024-03-20
Payer: COMMERCIAL

## 2024-03-20 DIAGNOSIS — L72.2 STEATOCYSTOMA MULTIPLEX: ICD-10-CM

## 2024-03-20 DIAGNOSIS — L70.0 ACNE VULGARIS: ICD-10-CM

## 2024-03-20 DIAGNOSIS — Z51.81 MEDICATION MONITORING ENCOUNTER: ICD-10-CM

## 2024-03-22 NOTE — TELEPHONE ENCOUNTER
Last visit patient had, it was mentioned that she needed a refill on Retin A.  Patient did not receive a refill and would like for you to confirm the strength.

## 2024-03-26 RX ORDER — TRETINOIN 1 MG/G
CREAM TOPICAL NIGHTLY
Qty: 45 G | Refills: 5 | Status: SHIPPED | OUTPATIENT
Start: 2024-03-26

## 2024-05-08 ENCOUNTER — OFFICE VISIT (OUTPATIENT)
Dept: PRIMARY CARE CLINIC | Facility: CLINIC | Age: 39
End: 2024-05-08
Payer: COMMERCIAL

## 2024-05-08 DIAGNOSIS — K64.4 EXTERNAL HEMORRHOIDS: Primary | ICD-10-CM

## 2024-05-08 PROCEDURE — 1160F RVW MEDS BY RX/DR IN RCRD: CPT | Mod: CPTII,95,, | Performed by: INTERNAL MEDICINE

## 2024-05-08 PROCEDURE — 99213 OFFICE O/P EST LOW 20 MIN: CPT | Mod: 95,,, | Performed by: INTERNAL MEDICINE

## 2024-05-08 PROCEDURE — 1159F MED LIST DOCD IN RCRD: CPT | Mod: CPTII,95,, | Performed by: INTERNAL MEDICINE

## 2024-05-08 RX ORDER — HYDROCODONE BITARTRATE AND ACETAMINOPHEN 5; 325 MG/1; MG/1
1 TABLET ORAL EVERY 8 HOURS PRN
Qty: 15 TABLET | Refills: 0 | Status: ON HOLD | OUTPATIENT
Start: 2024-05-08 | End: 2024-06-13

## 2024-05-08 RX ORDER — HYDROCORTISONE ACETATE PRAMOXINE HCL 1; 1 G/100G; G/100G
CREAM TOPICAL 3 TIMES DAILY
Qty: 30 G | Refills: 0 | Status: SHIPPED | OUTPATIENT
Start: 2024-05-08

## 2024-05-12 ENCOUNTER — PATIENT MESSAGE (OUTPATIENT)
Dept: PRIMARY CARE CLINIC | Facility: CLINIC | Age: 39
End: 2024-05-12
Payer: COMMERCIAL

## 2024-05-13 NOTE — PROGRESS NOTES
Subjective:    The patient location is: home  The chief complaint leading to consultation is:     Visit type: audiovisual    Face to Face time with patient: 15   minutes of total time spent on the encounter, which includes face to face time and non-face to face time preparing to see the patient (eg, review of tests), Obtaining and/or reviewing separately obtained history, Documenting clinical information in the electronic or other health record, Independently interpreting results (not separately reported) and communicating results to the patient/family/caregiver, or Care coordination (not separately reported).         Each patient to whom he or she provides medical services by telemedicine is:  (1) informed of the relationship between the physician and patient and the respective role of any other health care provider with respect to management of the patient; and (2) notified that he or she may decline to receive medical services by telemedicine and may withdraw from such care at any time.    Notes:     Patient ID: Mya Mehta is a 39 y.o. female.    Chief Complaint: No chief complaint on file.    HPI  Pt without any major madical conditions visit today h/o hemorrhoids for long time flare up sometime in last 2-3 weeks hemorrhoide getting worse in a lot of pain can't sit down or standing hurt pt try OTC medications and keep stool soft but still hurt pt is still working sitting office make it worst no other c/o   Review of Systems    Objective:      Physical Exam  Constitutional:       General: She is not in acute distress.     Appearance: Normal appearance.   HENT:      Head: Normocephalic and atraumatic.   Eyes:      Extraocular Movements: Extraocular movements intact.      Pupils: Pupils are equal, round, and reactive to light.   Pulmonary:      Breath sounds: Normal breath sounds.   Abdominal:      Palpations: Abdomen is soft.      Tenderness: There is no abdominal tenderness.   Neurological:      Mental  Status: She is alert and oriented to person, place, and time.   Psychiatric:         Mood and Affect: Mood normal.         Thought Content: Thought content normal.         Judgment: Judgment normal.         Assessment:       1. External hemorrhoids        Plan:       External hemorrhoids  Comments:  painful pt agreeable to usrgical consult  Orders:  -     HYDROcodone-acetaminophen (NORCO) 5-325 mg per tablet; Take 1 tablet by mouth every 8 (eight) hours as needed for Pain.  Dispense: 15 tablet; Refill: 0  -     pramoxine-hydrocortisone (PROCTOCREAM-HC) 1-1 % rectal cream; Place rectally 3 (three) times daily.  Dispense: 30 g; Refill: 0  -     Ambulatory referral/consult to General Surgery; Future; Expected date: 05/15/2024        Medication List with Changes/Refills   New Medications    HYDROCODONE-ACETAMINOPHEN (NORCO) 5-325 MG PER TABLET    Take 1 tablet by mouth every 8 (eight) hours as needed for Pain.    PRAMOXINE-HYDROCORTISONE (PROCTOCREAM-HC) 1-1 % RECTAL CREAM    Place rectally 3 (three) times daily.   Current Medications    ADAPALENE 0.3 % GEL    Apply topically every evening. Apply thin layer to all affected areas nightly. Start slow, up titrate as tolerated.    DIAZEPAM (VALIUM) 5 MG TABLET    1 po q.d. p.r.n. anxiety    TRETINOIN (RETIN-A) 0.1 % CREAM    Apply topically every evening.

## 2024-05-14 ENCOUNTER — PATIENT MESSAGE (OUTPATIENT)
Dept: PODIATRY | Facility: CLINIC | Age: 39
End: 2024-05-14
Payer: COMMERCIAL

## 2024-06-04 ENCOUNTER — PATIENT MESSAGE (OUTPATIENT)
Dept: SURGERY | Facility: CLINIC | Age: 39
End: 2024-06-04
Payer: COMMERCIAL

## 2024-06-04 ENCOUNTER — TELEPHONE (OUTPATIENT)
Dept: SURGERY | Facility: CLINIC | Age: 39
End: 2024-06-04
Payer: COMMERCIAL

## 2024-06-04 NOTE — TELEPHONE ENCOUNTER
Placed call to pt regarding her request for a sooner appt.   Currently scheduled w/ Jaleesa Scott NP on 6/18 for external hemorrhoids. She is now experiencing constant pain.    LVM w/ callback number & reason for calling. Will place in Dr. Lo's open slot for tomorrow, 6/5 @ 9:20 AM. Advised to message in portal or give us a call if she is unable to make that appointment.

## 2024-06-05 ENCOUNTER — TELEPHONE (OUTPATIENT)
Dept: ENDOSCOPY | Facility: HOSPITAL | Age: 39
End: 2024-06-05
Payer: COMMERCIAL

## 2024-06-05 ENCOUNTER — OFFICE VISIT (OUTPATIENT)
Dept: SURGERY | Facility: CLINIC | Age: 39
End: 2024-06-05
Attending: COLON & RECTAL SURGERY
Payer: COMMERCIAL

## 2024-06-05 VITALS
DIASTOLIC BLOOD PRESSURE: 86 MMHG | HEART RATE: 77 BPM | WEIGHT: 173.5 LBS | SYSTOLIC BLOOD PRESSURE: 125 MMHG | BODY MASS INDEX: 27.17 KG/M2

## 2024-06-05 DIAGNOSIS — Z12.11 SCREENING FOR COLON CANCER: Primary | ICD-10-CM

## 2024-06-05 DIAGNOSIS — K64.9 HEMORRHOIDS, UNSPECIFIED HEMORRHOID TYPE: Primary | ICD-10-CM

## 2024-06-05 DIAGNOSIS — K62.5 RECTAL BLEEDING: Primary | ICD-10-CM

## 2024-06-05 PROCEDURE — 1160F RVW MEDS BY RX/DR IN RCRD: CPT | Mod: CPTII,S$GLB,, | Performed by: COLON & RECTAL SURGERY

## 2024-06-05 PROCEDURE — 3074F SYST BP LT 130 MM HG: CPT | Mod: CPTII,S$GLB,, | Performed by: COLON & RECTAL SURGERY

## 2024-06-05 PROCEDURE — 3008F BODY MASS INDEX DOCD: CPT | Mod: CPTII,S$GLB,, | Performed by: COLON & RECTAL SURGERY

## 2024-06-05 PROCEDURE — 99999 PR PBB SHADOW E&M-EST. PATIENT-LVL III: CPT | Mod: PBBFAC,,, | Performed by: COLON & RECTAL SURGERY

## 2024-06-05 PROCEDURE — 99204 OFFICE O/P NEW MOD 45 MIN: CPT | Mod: 25,S$GLB,, | Performed by: COLON & RECTAL SURGERY

## 2024-06-05 PROCEDURE — 46600 DIAGNOSTIC ANOSCOPY SPX: CPT | Mod: S$GLB,,, | Performed by: COLON & RECTAL SURGERY

## 2024-06-05 PROCEDURE — 1159F MED LIST DOCD IN RCRD: CPT | Mod: CPTII,S$GLB,, | Performed by: COLON & RECTAL SURGERY

## 2024-06-05 PROCEDURE — 3079F DIAST BP 80-89 MM HG: CPT | Mod: CPTII,S$GLB,, | Performed by: COLON & RECTAL SURGERY

## 2024-06-05 RX ORDER — POLYETHYLENE GLYCOL 3350, SODIUM SULFATE ANHYDROUS, SODIUM BICARBONATE, SODIUM CHLORIDE, POTASSIUM CHLORIDE 236; 22.74; 6.74; 5.86; 2.97 G/4L; G/4L; G/4L; G/4L; G/4L
4 POWDER, FOR SOLUTION ORAL ONCE
Qty: 4000 ML | Refills: 0 | Status: SHIPPED | OUTPATIENT
Start: 2024-06-05 | End: 2024-06-05

## 2024-06-05 NOTE — TELEPHONE ENCOUNTER
Spoke to pt to schedule procedure(s) Colonoscopy       Physician to perform procedure(s) Dr. YESENIA Lo  Date of Procedure (s) 8/01/24  Arrival Time 7:45 AM  Time of Procedure(s) 8:45 AM   Location of Procedure(s) [place] 4th Floor  Type of Rx Prep sent to patient: PEG  Instructions provided to patient via [facility]/in clinic    Patient was informed on the following information and verbalized understanding. Screening questionnaire reviewed with patient and complete. If procedure requires anesthesia, a responsible adult needs to be present to accompany the patient home, patient cannot drive after receiving anesthesia. Appointment details are tentative, especially check-in time. Patient will receive a prep-op call 7 days prior to confirm check-in time for procedure. If applicable the patient should contact their pharmacy to verify Rx for procedure prep is ready for pick-up. Patient was advised to call the scheduling department at [phone] if pharmacy states no Rx is available. Patient was advised to call the endoscopy scheduling department if any questions or concerns arise.      [facility] Scheduling Department

## 2024-06-05 NOTE — PROGRESS NOTES
CRS Office Visit History and Physical    Referring Md:   Jaleesa Scott, Np  [address] [place] [address]    SUBJECTIVE:     Chief Complaint: Hemorrhoids    History of Present Illness:  The patient is new patient to this practice.   Course is as follows:  Patient is a 39 y.o. female presents with hemorrhoids.  Symptoms have been present for several years.  Previously worked from home and would deal with it (Ibuprofen, lying down). Flares happened during her cycle and would last 2-3 days.  Now has an in-person job.  Most recent episode has lasted for 1 month.  Pain is mainly during/after a BM.  Has tried Prep H cream.  Associated bleeding: yes    Prior colonoscopy: No  Prior abdominal surgery: No  Prior pelvic or anorectal surgery: No  Family history of colon/rectal cancer: No  Family history of IBD: No  Steroid or other immunosuppression: No  Blood thinners: No  Current stool softeners or fiber supplement: No  Active smoking: No  Prior deliveries: Yes - 2  complicated by tear: No    [place] (FI):  Total: 0    Altomare (ODS):  How long on toilet:   <5min (0)   How many times/day:  1 (0)   Digitation:   Never (0)   Laxatives:   Never (0)   Enemas:    Never (0)  Incomplete stool:  Never (0)   Strain:    <25% (1)   Total: 1    Bowel movements per month:   Few times per week   Leakage of urine: No  Trouble emptying your bladder: No  Feel something bulging through vagina? No        Review of patient's allergies indicates:  No Known Allergies    No past medical history on file.  No past surgical history on file.  Family History   Problem Relation Name Age of Onset    Cancer Mother       Social History     Tobacco Use    Smoking status: Never    Smokeless tobacco: Never   Substance Use Topics    Alcohol use: No        Review of Systems:  ROS    OBJECTIVE:     Vital Signs (Most Recent)  /86 (BP Location: Left arm, Patient Position: Sitting, BP Method: Medium (Automatic))   Pulse 77   Wt 78.7 kg  (173 lb 8 oz)   BMI 27.17 kg/m²     Physical Exam:  General: Black or  female in no distress   Neuro: Alert and oriented x 4.  Moves all extremities.     HEENT: No icterus.  Trachea midline  Respiratory: Respirations are even and unlabored  Cardiac: Regular rate  Extremities: Warm dry and intact  Skin: No rashes     Patient was examined w/ a chaperone present.    Anorectal:   External exam: Chronically prolapsed right posterior hemorrhoid, hard to see clearly to rule out fissure  Digital exam revealed normal tone. No masses. No blood.    Anoscopy:  Verbal consent was obtained.   Limited exam.  Clear plastic anoscope inserted.    Grade III hemorrhoids seen.      ASSESSMENT/PLAN:     38yo F w/ anal pain    The patient was instructed to:  Increase water intake to at least 8-10 glasses of water per day.  Take a daily fiber supplement (Konsyl, Benefiber, Metamucil) and increase dietary intake to 20-30 grams/day.  Avoid straining or spending >5min/event with bowel movements.  Schedule colonoscopy with banding.  Referral message sent and she was walked to Endo schedulers.    Melonie Lo MD  Staff Surgeon, Colon and Rectal Surgery  [facility]

## 2024-06-05 NOTE — TELEPHONE ENCOUNTER
"----- Message from Hero Metzger MA sent at 2024 10:05 AM CDT -----    ----- Message -----  From: Melonie Lo MD  Sent: 2024   9:58 AM CDT  To: [facility]    Procedure: Colonoscopy (and banding) WITH ME, prefers Thursday    Diagnosis: Rectal bleeding    Procedure Timin-12 weeks    #If within 4 weeks selected, please satnam as high priority#    #If greater than 12 weeks, please select "5-12 weeks" and delay sending until 3 months prior to requested date#     Location: [address]    Additional Scheduling Information: No scheduling concerns    Prep Specifications:Standard prep    Is the patient taking a GLP-1 Agonist:no    Have you attached a patient to this message: yes  "

## 2024-06-07 ENCOUNTER — PATIENT MESSAGE (OUTPATIENT)
Dept: SURGERY | Facility: CLINIC | Age: 39
End: 2024-06-07
Payer: COMMERCIAL

## 2024-06-07 ENCOUNTER — TELEPHONE (OUTPATIENT)
Dept: ENDOSCOPY | Facility: HOSPITAL | Age: 39
End: 2024-06-07
Payer: COMMERCIAL

## 2024-06-07 ENCOUNTER — HOSPITAL ENCOUNTER (EMERGENCY)
Facility: HOSPITAL | Age: 39
Discharge: HOME | End: 2024-06-07
Attending: EMERGENCY MEDICINE
Payer: COMMERCIAL

## 2024-06-07 VITALS
DIASTOLIC BLOOD PRESSURE: 92 MMHG | TEMPERATURE: 98 F | HEART RATE: 80 BPM | WEIGHT: 170 LBS | BODY MASS INDEX: 26.68 KG/M2 | RESPIRATION RATE: 20 BRPM | OXYGEN SATURATION: 97 % | HEIGHT: 67 IN | SYSTOLIC BLOOD PRESSURE: 154 MMHG

## 2024-06-07 DIAGNOSIS — K64.9 HEMORRHOIDS, UNSPECIFIED HEMORRHOID TYPE: Primary | ICD-10-CM

## 2024-06-07 PROCEDURE — 25000003 PHARM REV CODE 250: Performed by: EMERGENCY MEDICINE

## 2024-06-07 PROCEDURE — 99283 EMERGENCY DEPT VISIT LOW MDM: CPT

## 2024-06-07 RX ORDER — LIDOCAINE AND PRILOCAINE 25; 25 MG/G; MG/G
CREAM TOPICAL
Status: COMPLETED | OUTPATIENT
Start: 2024-06-07 | End: 2024-06-07

## 2024-06-07 RX ORDER — POLYETHYLENE GLYCOL 3350 17 G/17G
17 POWDER, FOR SOLUTION ORAL DAILY PRN
Qty: 20 PACKET | Refills: 0 | Status: SHIPPED | OUTPATIENT
Start: 2024-06-07

## 2024-06-07 RX ORDER — HYDROCORTISONE ACETATE 25 MG/1
25 SUPPOSITORY RECTAL 2 TIMES DAILY PRN
Qty: 12 SUPPOSITORY | Refills: 0 | Status: SHIPPED | OUTPATIENT
Start: 2024-06-07 | End: 2024-06-17

## 2024-06-07 RX ADMIN — LIDOCAINE AND PRILOCAINE: 25; 25 CREAM TOPICAL at 10:06

## 2024-06-07 NOTE — ED NOTES
Patient complains of hemorrhoid pain that has worsened over the past several days- denies blood in stool

## 2024-06-07 NOTE — TELEPHONE ENCOUNTER
Patient contact the office to schedule procedure for a earlier date due to sever pain sytoms. Patient was inform of the next available and if she is in sever pain ER is a recommendation. Patient was reschedule updated prep instructions to portal and patient stated that she will need to  rx from pharmacy. Patient verbalized understanding.     Spoke to patient to schedule procedure(s) Colonoscopy       Physician to perform procedure(s) Dr. ADDISON Ferguson  Date of Procedure (s) 6/18/202  Arrival Time 10:00 Am   Time of Procedure(s) 11:00 Am    Location of Procedure(s) [place] 4th Floor  Type of Rx Prep sent to patient: PEG  Instructions provided to patient via [facility]    Patient was informed on the following information and verbalized understanding. Screening questionnaire reviewed with patient and complete. If procedure requires anesthesia, a responsible adult needs to be present to accompany the patient home, patient cannot drive after receiving anesthesia. Appointment details are tentative, especially check-in time. Patient will receive a prep-op call 7 days prior to confirm check-in time for procedure. If applicable the patient should contact their pharmacy to verify Rx for procedure prep is ready for pick-up. Patient was advised to call the scheduling department at [phone] if pharmacy states no Rx is available. Patient was advised to call the endoscopy scheduling department if any questions or concerns arise.       Endoscopy Scheduling Department

## 2024-06-07 NOTE — DISCHARGE INSTRUCTIONS
You were prescribed 2 different suppositories.  You can try 1 and then the other to see which 1 works better for you.    You were also prescribed MiraLax take as needed to soften your stool up to 17 g per day.    Please follow-up with your Colorectal team for continued management.    Can take 1 g of Tylenol every 8 hours.  You can take up to 400 mg of ibuprofen every 6 hours.  Better to take it with food.    If you develop any new, worsening worrisome symptoms please return to emergency department.  Otherwise follow-up as above with your Colorectal team for hemorrhoidal procedure.

## 2024-06-07 NOTE — ED PROVIDER NOTES
Encounter Date: 6/7/2024       History     Chief Complaint   Patient presents with    Hemorrhoids     Seen by my dr, hemorrhoid banding sched in aug     HPI    39-year-old female with past medical history as noted below, ongoing treatment for hemorrhoid was seen by Colorectal on 06/05 see note below.   She was initially scheduled for a hemorrhoidal banding in August she called to have it moved up in his now scheduled for June 18th.  She has coming to the emergency department because of severe pain.  She says that she has a work where she has to sit for long periods of time and this has been exacerbating her symptoms.  She has a feeling of burning and pain/pressure in her  anus.  She says that the pain improves when she does not have a bowel movement for a few days but with bowel movements is very painful.  She attempts to space out the times between bowel movements.  She drinks plenty of water and has been using ibuprofen up to 2400 mg per 24 hours.   She has been attempting some hemorrhoidal creams as well.     She is unsure if there is any blood in her stool she does not look at her stool.  She denies abdominal pain, fevers, chest pain, shortness of breath, dizziness or weakness.      She has not been taking any stool softeners but attempts to eat high-fiber diet.    Review of patient's allergies indicates:  No Known Allergies  No past medical history on file.  No past surgical history on file.  Family History   Problem Relation Name Age of Onset    Cancer Mother       Social History     Tobacco Use    Smoking status: Never    Smokeless tobacco: Never   Substance Use Topics    Alcohol use: No     Review of Systems    Physical Exam     Initial Vitals [06/07/24 0928]   BP Pulse Resp Temp SpO2   (!) 154/92 80 20 98 °F (36.7 °C) 97 %      MAP       --         Physical Exam    Physical Exam:  CONSTITUTIONAL: Well developed, well nourished, in no acute distress.  HENT: Normocephalic, atraumatic    EYES: Sclerae  anicteric   NECK: Supple, no thyroid enlargement  RESPIRATORY: Speaking in full sentences. Breathing comfortably.   ABDOMEN: Soft and nontender, no masses, no rebound or guarding   RECTAL:  Small skin tag, no external hemorrhoids seen, no obvious fissures.  Digital rectal exam deferred secondary to recent and scope and patient discomfort.  No external bleeding,  there is  a small amount of hemorrhoidal cream around the anus.  NEUROLOGIC: Alert, interacting normally. No facial droop.   MSK: Moving all four extremities.  Skin: Warm and dry. No visible rash on exposed areas of skin.    Psych: Mood and affect normal.       ED Course   Procedures  Labs Reviewed - No data to display         Imaging Results    None          Medications   LIDOcaine-prilocaine cream ( Topical (Top) Given 6/7/24 1052)     Medical Decision Making  Amount and/or Complexity of Data Reviewed  External Data Reviewed: notes.     Details: Colorectal note from 06/05:  Anorectal:   External exam: Chronically prolapsed right posterior hemorrhoid, hard to see clearly to rule out fissure  Digital exam revealed normal tone. No masses. No blood.     Anoscopy:  Verbal consent was obtained.   Limited exam.  Clear plastic anoscope inserted.    Grade III hemorrhoids seen.        ASSESSMENT/PLAN:     38yo F w/ anal pain     The patient was instructed to:  Increase water intake to at least 8-10 glasses of water per day.  Take a daily fiber supplement (Konsyl, Benefiber, Metamucil) and increase dietary intake to 20-30 grams/day.  Avoid straining or spending >5min/event with bowel movements.  Schedule colonoscopy with banding.  Referral message sent and she was walked to Endo schedulers.      Telephone call from today:  Patient contact the office to schedule procedure for a earlier date due to sever pain sytoms. Patient was inform of the next available and if she is in sever pain ER is a recommendation. Patient was reschedule updated prep instructions to portal  and patient stated that she will need to  rx from pharmacy. Patient verbalized understanding.    Risk  OTC drugs.  Prescription drug management.       39-year-old female with past medical history as noted coming in with continued hemorrhoidal pain in the setting of recently diagnosed hemorrhoids.  No abdominal pain.  No fevers.  Pain is worse with bowel movements.      Abdomen is entirely benign.  In his without swelling, redness, visible external hemorrhoid or prolapsing internal hemorrhoid.      She does says that she has prolapse with bowel movements but then pushes her hemorrhoids back in.      Possible hemorrhoidal pain although classically internal hemorrhoids are painless, possible fissure that it can not see.  Possible inflammation from bowel movements causing pain.  No signs of infection.  No signs of dangerous abdominal pathology.      Will avoid opiates at this time as they do not want to make her more constipated.  She already took 1 g of Tylenol before coming to the emergency department as well as 600 mg of Motrin.      Will attempt hemorrhoidal suppositories as the creams are likely not penetrating deeper into her anal canal.  Will attempt steroid suppository as well as phenylephrine and cocoa butter suppositories.      Lidocaine cream also ordered given the patient which she can try.      I advised her to  start stool softeners, MiraLax, even though she has pain with stools if she was able to get her stools to a soft consistency pain is likely to improve, if she attempts to prevent bowel movements secondary to pain, bowel movements maybe more hard and larger which caused more pain.      No indication for labs or imaging at this time.      Not consistent with acutely dangerous pathology at this time.      Will discharge with continued follow-up with Colorectal team, ED return precautions.    Findings of ED work up and return precautions verbally explained to patient. Patient agrees with  discharge plan, return instructions and verbalizes understanding of return precautions.                                       Clinical Impression:  Final diagnoses:  [K64.9] Hemorrhoids, unspecified hemorrhoid type (Primary)          ED Disposition Condition    Discharge Stable          ED Prescriptions       Medication Sig Dispense Start Date End Date Auth. Provider    hydrocortisone (ANUSOL-HC) 25 mg suppository Place 1 suppository (25 mg total) rectally 2 (two) times daily as needed for Hemorrhoids. 12 suppository 6/7/2024 6/17/2024 Nicholas Chapa MD    phenylephrine-cocoa butter 0.25-88.44 % Supp suppository Place 1 suppository rectally 4 (four) times daily as needed (hemorroid pain). 12 suppository 6/7/2024 -- Nicholas Chapa MD    polyethylene glycol (GLYCOLAX) 17 gram PwPk Take 17 g by mouth daily as needed for Constipation (to soften stool). Take as much as needed to soften stool to a soft serve ice cream consistency, up to 1 pack per day. 20 packet 6/7/2024 -- Nicholas Chapa MD          Follow-up Information       Follow up With Specialties Details Why Contact Info Additional Information    [facility] Colon and Rectal Surgery   [address] [address] [phone] [facility] 4[facility] Please park in [facility] and use [facility] elevator             Nicholas Chapa MD  06/07/24 8154

## 2024-06-07 NOTE — Clinical Note
"Mya Piedra" St Hernandez was seen and treated in our emergency department on 6/7/2024.  She may return to work on 06/12/2024.       If you have any questions or concerns, please don't hesitate to call.      MARITZA Ballard RN    "

## 2024-06-12 ENCOUNTER — TELEPHONE (OUTPATIENT)
Dept: ENDOSCOPY | Facility: HOSPITAL | Age: 39
End: 2024-06-12
Payer: COMMERCIAL

## 2024-06-12 NOTE — TELEPHONE ENCOUNTER
Contacted Pt for endoscopy pre-call for upcoming procedure.  Pre-call complete, Pt does not have any further questions. Arrival time:09:15 am

## 2024-06-13 ENCOUNTER — ANESTHESIA EVENT (OUTPATIENT)
Dept: ENDOSCOPY | Facility: HOSPITAL | Age: 39
End: 2024-06-13
Payer: COMMERCIAL

## 2024-06-13 ENCOUNTER — ANESTHESIA (OUTPATIENT)
Dept: ENDOSCOPY | Facility: HOSPITAL | Age: 39
End: 2024-06-13
Payer: COMMERCIAL

## 2024-06-13 ENCOUNTER — HOSPITAL ENCOUNTER (OUTPATIENT)
Facility: HOSPITAL | Age: 39
Discharge: HOME | End: 2024-06-13
Attending: COLON & RECTAL SURGERY | Admitting: COLON & RECTAL SURGERY
Payer: COMMERCIAL

## 2024-06-13 VITALS
RESPIRATION RATE: 17 BRPM | TEMPERATURE: 98 F | WEIGHT: 170 LBS | HEART RATE: 65 BPM | HEIGHT: 67 IN | DIASTOLIC BLOOD PRESSURE: 83 MMHG | OXYGEN SATURATION: 99 % | BODY MASS INDEX: 26.68 KG/M2 | SYSTOLIC BLOOD PRESSURE: 123 MMHG

## 2024-06-13 DIAGNOSIS — K62.5 RECTAL BLEEDING: Primary | ICD-10-CM

## 2024-06-13 PROBLEM — K64.9 HEMORRHOIDS: Status: ACTIVE | Noted: 2024-06-13

## 2024-06-13 PROBLEM — Z86.0100 HX OF COLONIC POLYPS: Status: ACTIVE | Noted: 2024-06-13

## 2024-06-13 PROBLEM — Z86.010 HX OF COLONIC POLYPS: Status: ACTIVE | Noted: 2024-06-13

## 2024-06-13 LAB
B-HCG UR QL: NEGATIVE
CTP QC/QA: YES

## 2024-06-13 PROCEDURE — 27201089 HC SNARE, DISP (ANY): Performed by: COLON & RECTAL SURGERY

## 2024-06-13 PROCEDURE — 81025 URINE PREGNANCY TEST: CPT | Performed by: COLON & RECTAL SURGERY

## 2024-06-13 PROCEDURE — 63600175 PHARM REV CODE 636 W HCPCS: Performed by: STUDENT IN AN ORGANIZED HEALTH CARE EDUCATION/TRAINING PROGRAM

## 2024-06-13 PROCEDURE — 37000008 HC ANESTHESIA 1ST 15 MINUTES: Performed by: COLON & RECTAL SURGERY

## 2024-06-13 PROCEDURE — 63600175 PHARM REV CODE 636 W HCPCS: Performed by: NURSE ANESTHETIST, CERTIFIED REGISTERED

## 2024-06-13 PROCEDURE — 25000003 PHARM REV CODE 250: Performed by: COLON & RECTAL SURGERY

## 2024-06-13 PROCEDURE — 88305 TISSUE EXAM BY PATHOLOGIST: CPT | Performed by: PATHOLOGY

## 2024-06-13 PROCEDURE — 63600175 PHARM REV CODE 636 W HCPCS: Mod: JZ,JG | Performed by: COLON & RECTAL SURGERY

## 2024-06-13 PROCEDURE — 45385 COLONOSCOPY W/LESION REMOVAL: CPT | Performed by: COLON & RECTAL SURGERY

## 2024-06-13 PROCEDURE — 27201022: Performed by: COLON & RECTAL SURGERY

## 2024-06-13 PROCEDURE — 63600175 PHARM REV CODE 636 W HCPCS: Performed by: COLON & RECTAL SURGERY

## 2024-06-13 PROCEDURE — 37000009 HC ANESTHESIA EA ADD 15 MINS: Performed by: COLON & RECTAL SURGERY

## 2024-06-13 PROCEDURE — 46221 LIGATION OF HEMORRHOID(S): CPT | Performed by: COLON & RECTAL SURGERY

## 2024-06-13 PROCEDURE — 45385 COLONOSCOPY W/LESION REMOVAL: CPT | Mod: ,,, | Performed by: COLON & RECTAL SURGERY

## 2024-06-13 PROCEDURE — 25000003 PHARM REV CODE 250: Performed by: NURSE ANESTHETIST, CERTIFIED REGISTERED

## 2024-06-13 PROCEDURE — 46221 LIGATION OF HEMORRHOID(S): CPT | Mod: ,,, | Performed by: COLON & RECTAL SURGERY

## 2024-06-13 PROCEDURE — 88305 TISSUE EXAM BY PATHOLOGIST: CPT | Mod: 26,,, | Performed by: PATHOLOGY

## 2024-06-13 RX ORDER — PROPOFOL 10 MG/ML
INJECTION, EMULSION INTRAVENOUS CONTINUOUS PRN
Status: DISCONTINUED | OUTPATIENT
Start: 2024-06-13 | End: 2024-06-13

## 2024-06-13 RX ORDER — FENTANYL CITRATE 50 UG/ML
25 INJECTION, SOLUTION INTRAMUSCULAR; INTRAVENOUS ONCE
Status: COMPLETED | OUTPATIENT
Start: 2024-06-13 | End: 2024-06-13

## 2024-06-13 RX ORDER — LIDOCAINE HYDROCHLORIDE 20 MG/ML
INJECTION INTRAVENOUS
Status: DISCONTINUED | OUTPATIENT
Start: 2024-06-13 | End: 2024-06-13

## 2024-06-13 RX ORDER — ONDANSETRON HYDROCHLORIDE 2 MG/ML
INJECTION, SOLUTION INTRAVENOUS
Status: DISCONTINUED | OUTPATIENT
Start: 2024-06-13 | End: 2024-06-13

## 2024-06-13 RX ORDER — PROPOFOL 10 MG/ML
VIAL (ML) INTRAVENOUS
Status: DISCONTINUED | OUTPATIENT
Start: 2024-06-13 | End: 2024-06-13

## 2024-06-13 RX ORDER — FENTANYL CITRATE 50 UG/ML
INJECTION, SOLUTION INTRAMUSCULAR; INTRAVENOUS
Status: DISCONTINUED | OUTPATIENT
Start: 2024-06-13 | End: 2024-06-13

## 2024-06-13 RX ORDER — SODIUM CHLORIDE 9 MG/ML
INJECTION, SOLUTION INTRAVENOUS CONTINUOUS
Status: DISCONTINUED | OUTPATIENT
Start: 2024-06-13 | End: 2024-06-13 | Stop reason: HOSPADM

## 2024-06-13 RX ORDER — HYDROCODONE BITARTRATE AND ACETAMINOPHEN 5; 325 MG/1; MG/1
1 TABLET ORAL EVERY 6 HOURS PRN
Qty: 5 TABLET | Refills: 0 | Status: SHIPPED | OUTPATIENT
Start: 2024-06-13

## 2024-06-13 RX ORDER — BUPIVACAINE HYDROCHLORIDE 2.5 MG/ML
INJECTION, SOLUTION EPIDURAL; INFILTRATION; INTRACAUDAL
Status: DISCONTINUED | OUTPATIENT
Start: 2024-06-13 | End: 2024-06-13 | Stop reason: HOSPADM

## 2024-06-13 RX ADMIN — FENTANYL CITRATE 25 MCG: 50 INJECTION, SOLUTION INTRAMUSCULAR; INTRAVENOUS at 10:06

## 2024-06-13 RX ADMIN — SODIUM CHLORIDE: 0.9 INJECTION, SOLUTION INTRAVENOUS at 09:06

## 2024-06-13 RX ADMIN — PROPOFOL 30 MG: 10 INJECTION, EMULSION INTRAVENOUS at 10:06

## 2024-06-13 RX ADMIN — PROPOFOL 40 MG: 10 INJECTION, EMULSION INTRAVENOUS at 10:06

## 2024-06-13 RX ADMIN — PROPOFOL 150 MCG/KG/MIN: 10 INJECTION, EMULSION INTRAVENOUS at 10:06

## 2024-06-13 RX ADMIN — ONDANSETRON 4 MG: 2 INJECTION INTRAMUSCULAR; INTRAVENOUS at 10:06

## 2024-06-13 RX ADMIN — LIDOCAINE HYDROCHLORIDE 100 MG: 20 INJECTION INTRAVENOUS at 10:06

## 2024-06-13 RX ADMIN — PROPOFOL 100 MG: 10 INJECTION, EMULSION INTRAVENOUS at 10:06

## 2024-06-13 RX ADMIN — PROPOFOL 50 MG: 10 INJECTION, EMULSION INTRAVENOUS at 10:06

## 2024-06-13 RX ADMIN — FENTANYL CITRATE 25 MCG: 50 INJECTION INTRAMUSCULAR; INTRAVENOUS at 11:06

## 2024-06-13 NOTE — ANESTHESIA PREPROCEDURE EVALUATION
"                                                                                                             2024  Pre-operative evaluation for Procedure(s) (LRB):  COLONOSCOPY (N/A)    Mya Mehta is a 39 y.o. female with pmh for hemorrhoids presents for above.      History reviewed. No pertinent past medical history.    Review of patient's allergies indicates:  No Known Allergies    No current facility-administered medications on file prior to encounter.     Current Outpatient Medications on File Prior to Encounter   Medication Sig Dispense Refill    adapalene 0.3 % gel Apply topically every evening. Apply thin layer to all affected areas nightly. Start slow, up titrate as tolerated. 45 g 5    diazePAM (VALIUM) 5 MG tablet 1 po q.d. p.r.n. anxiety 30 tablet 5    HYDROcodone-acetaminophen (NORCO) 5-325 mg per tablet Take 1 tablet by mouth every 8 (eight) hours as needed for Pain. 15 tablet 0    pramoxine-hydrocortisone (PROCTOCREAM-HC) 1-1 % rectal cream Place rectally 3 (three) times daily. 30 g 0    tretinoin (RETIN-A) 0.1 % cream Apply topically every evening. 45 g 5       History reviewed. No pertinent surgical history.    CBC: No results for input(s): "WBC", "HGB", "HCT", "PLT" in the last 72 hours.    CMP: No results for input(s): "NA", "K", "CL", "CO2", "BUN", "CREATININE", "GLU", "MG", "PHOS", "CALCIUM", "ALBUMIN", "PROT", "ALKPHOS", "ALT", "AST", "BILITOT" in the last 72 hours.    COAGS  No results for input(s): "PT", "INR", "PROTIME", "APTT" in the last 72 hours.    BP Readings from Last 3 Encounters:   24 (!) 142/77   24 (!) 154/92   24 125/86       Diagnostic Studies:      EKD Echo:  No results found for this or any previous visit.        Pre-op Assessment    I have reviewed the Patient Summary Reports.     I have reviewed the Nursing Notes. I have reviewed the NPO Status.   I have reviewed the Medications.     Review of Systems  Anesthesia Hx:               " Denies Personal Hx of Anesthesia complications.                        Physical Exam  General: Alert, Cooperative and Oriented    Airway:  Mallampati: II   Mouth Opening: Normal  TM Distance: Normal  Neck ROM: Normal ROM        Anesthesia Plan  Type of Anesthesia, risks & benefits discussed:    Anesthesia Type: Gen Natural Airway  Intra-op Monitoring Plan: Standard ASA Monitors  Post Op Pain Control Plan: multimodal analgesia  Induction:  IV  Airway Plan: Direct  Informed Consent: Informed consent signed with the Patient and all parties understand the risks and agree with anesthesia plan.  All questions answered.   ASA Score: 1  Day of Surgery Review of History & Physical: H&P Update referred to the surgeon/provider.    Ready For Surgery From Anesthesia Perspective.     .

## 2024-06-13 NOTE — H&P
"COLONOSCOPY HISTORY AND PHYSICAL EXAM    Procedure : Colonoscopy      INDICATIONS: rectal bleeding and hemorrhoids    Family Hx of CRC: None    Last Colonoscopy:  None  Findings: n/a       History reviewed. No pertinent past medical history.  Sedation Problems: NO  Family History   Problem Relation Name Age of Onset    Cancer Mother       Fam Hx of Sedation Problems: NO  Social History     Socioeconomic History    Marital status: Single   Tobacco Use    Smoking status: Never    Smokeless tobacco: Never   Substance and Sexual Activity    Alcohol use: No     Social Determinants of Health     Financial Resource Strain: Low Risk  (6/5/2024)    Overall Financial Resource Strain (CARDIA)     Difficulty of Paying Living Expenses: Not very hard   Food Insecurity: No Food Insecurity (6/5/2024)    Hunger Vital Sign     Worried About Running Out of Food in the Last Year: Never true     Ran Out of Food in the Last Year: Never true   Physical Activity: Insufficiently Active (6/5/2024)    Exercise Vital Sign     Days of Exercise per Week: 1 day     Minutes of Exercise per Session: 20 min   Stress: No Stress Concern Present (6/5/2024)    [employer] - Occupational Stress Questionnaire     Feeling of Stress : Only a little   Housing Stability: High Risk (6/5/2024)    Housing Stability Vital Sign     Unable to Pay for Housing in the Last Year: Yes       Review of Systems - Negative except   Respiratory ROS: no dyspnea  Cardiovascular ROS: no exertional chest pain  Gastrointestinal ROS: NO abdominal discomfort,  YE rectal bleeding  Musculoskeletal ROS: no muscular pain  Neurological ROS: no recent stroke    Physical Exam:  BP (!) 142/77 (BP Location: Left arm, Patient Position: Lying)   Pulse 79   Temp 98.2 °F (36.8 °C) (Temporal)   Resp 18   Ht 5' 7" (1.702 m)   Wt 77.1 kg (170 lb)   SpO2 98%   Breastfeeding No   BMI 26.63 kg/m²   General: no distress  Head: normocephalic  Mallampati Score "   Neck: supple, symmetrical, trachea midline  Lungs:  clear to auscultation bilaterally and normal respiratory effort  Heart: regular rate and rhythm and no murmur  Abdomen: soft, non-tender non-distented; bowel sounds normal; no masses,  no organomegaly  Extremities: no cyanosis or edema, or clubbing    ASA:  II    PLAN  COLONOSCOPY.    SedationPlan :MAC    The details of the procedure, the possible need for biopsy or polypectomy and the potential risks including bleeding, perforation, missed polyps were discussed in detail.

## 2024-06-13 NOTE — PROVATION PATIENT INSTRUCTIONS
Discharge Summary/Instructions after an Endoscopic Procedure  Patient Name: Mya Mehta  Patient MRN: [id number]  Patient YOB: 1985 Thursday, June 13, 2024  Melonie Lo MD  Dear patient,  As a result of recent federal legislation (The Federal Cures Act), you may   receive lab or pathology results from your procedure in your [facility]   account before your physician is able to contact you. Your physician or   their representative will relay the results to you with their   recommendations at their soonest availability.  Thank you,  RESTRICTIONS:  During your procedure today, you received medications for sedation.  These   medications may affect your judgment, balance and coordination.  Therefore,   for 24 hours, you have the following restrictions:   - DO NOT drive a car, operate machinery, make legal/financial decisions,   sign important papers or drink alcohol.    ACTIVITY:  Today: no heavy lifting, straining or running due to procedural   sedation/anesthesia.  The following day: return to full activity including work.  DIET:  Eat and drink normally unless instructed otherwise.     TREATMENT FOR COMMON SIDE EFFECTS:  - Mild abdominal pain, nausea, belching, bloating or excessive gas:  rest,   eat lightly and use a heating pad.  - Sore Throat: treat with throat lozenges and/or gargle with warm salt   water.  - Because air was used during the procedure, expelling large amounts of air   from your rectum or belching is normal.  - If a bowel prep was taken, you may not have a bowel movement for 1-3 days.    This is normal.  SYMPTOMS TO WATCH FOR AND REPORT TO YOUR PHYSICIAN:  1. Abdominal pain or bloating, other than gas cramps.  2. Chest pain.  3. Back pain.  4. Signs of infection such as: chills or fever occurring within 24 hours   after the procedure.  5. Rectal bleeding, which would show as bright red, maroon, or black stools.   (A tablespoon of blood from the rectum is not serious, especially  if   hemorrhoids are present.)  6. Vomiting.  7. Weakness or dizziness.  GO DIRECTLY TO THE NEAREST EMERGENCY ROOM IF YOU HAVE ANY OF THE FOLLOWING:      Difficulty breathing              Chills and/or fever over 101 F   Persistent vomiting and/or vomiting blood   Severe abdominal pain   Severe chest pain   Black, tarry stools   Bleeding- more than one tablespoon   Any other symptom or condition that you feel may need urgent attention  Your doctor recommends these additional instructions:  If any biopsies were taken, your doctors clinic will contact you in 1 to 2   weeks with any results.  - Discharge patient to home.   - Resume previous diet.   - Continue present medications.   - Await pathology results.   - Repeat colonoscopy date to be determined after pending pathology results   are reviewed for surveillance.   - Written discharge instructions were provided to the patient.   - The signs and symptoms of potential delayed complications were discussed   with the patient.   - Patient has a contact number available for emergencies.   - Return to normal activities tomorrow.  For questions, problems or results please call your physician - Melonie Lo MD at Work:  [phone].  [facility], EMERGENCY ROOM PHONE NUMBER: [phone]  IF A COMPLICATION OR EMERGENCY SITUATION ARISES AND YOU ARE UNABLE TO REACH   YOUR PHYSICIAN - GO DIRECTLY TO THE EMERGENCY ROOM.  Melonie Lo MD  6/13/2024 10:59:48 AM  This report has been verified and signed electronically.  Dear patient,  As a result of recent federal legislation (The Federal Cures Act), you may   receive lab or pathology results from your procedure in your [facility]   account before your physician is able to contact you. Your physician or   their representative will relay the results to you with their   recommendations at their soonest availability.  Thank you,  PROVATION

## 2024-06-13 NOTE — ANESTHESIA POSTPROCEDURE EVALUATION
Anesthesia Post Evaluation    Patient: May Mehta    Procedure(s) Performed: Procedure(s) (LRB):  COLONOSCOPY (N/A)    Final Anesthesia Type: general      Patient location during evaluation: GI PACU  Patient participation: Yes- Able to Participate  Level of consciousness: awake and alert and oriented  Post-procedure vital signs: reviewed and stable  Pain management: adequate  Airway patency: patent    PONV status at discharge: No PONV  Anesthetic complications: no      Cardiovascular status: hemodynamically stable  Respiratory status: unassisted  Hydration status: euvolemic  Follow-up not needed.              Vitals Value Taken Time   /83 06/13/24 1135   Temp 36.7 °C (98 °F) 06/13/24 1105   Pulse 65 06/13/24 1135   Resp 17 06/13/24 1135   SpO2 99 % 06/13/24 1135         Event Time   Out of Recovery 11:49:53         Pain/Kathrin Score: Pain Rating Prior to Med Admin: 10 (6/13/2024 11:12 AM)  Pain Rating Post Med Admin: 7 (6/13/2024 11:42 AM)  Kathrin Score: 10 (6/13/2024 11:20 AM)

## 2024-06-13 NOTE — TRANSFER OF CARE
Anesthesia Transfer of Care Note    Patient: Mya Mehta    Procedure(s) Performed: Procedure(s) (LRB):  COLONOSCOPY (N/A)    Patient location: GI    Anesthesia Type: general    Transport from OR: Transported from OR on room air with adequate spontaneous ventilation    Post pain: other: Pain med administered & local used per surgeon    Post assessment: no apparent anesthetic complications    Post vital signs: stable    Level of consciousness: responds to stimulation and awake    Nausea/Vomiting: no nausea/vomiting    Complications: none    Transfer of care protocol was followed      Last vitals: Visit Vitals  /77   Pulse 99   Temp 36   Resp 16   Ht    Wt    SpO2 100%   Breastfeeding    BMI

## 2024-06-17 LAB
FINAL PATHOLOGIC DIAGNOSIS: NORMAL
GROSS: NORMAL
Lab: NORMAL

## 2024-06-20 ENCOUNTER — PATIENT MESSAGE (OUTPATIENT)
Dept: SURGERY | Facility: CLINIC | Age: 39
End: 2024-06-20
Payer: COMMERCIAL

## 2024-07-05 ENCOUNTER — PATIENT MESSAGE (OUTPATIENT)
Dept: SURGERY | Facility: CLINIC | Age: 39
End: 2024-07-05
Payer: COMMERCIAL

## 2024-08-01 ENCOUNTER — PATIENT MESSAGE (OUTPATIENT)
Dept: SURGERY | Facility: CLINIC | Age: 39
End: 2024-08-01
Payer: COMMERCIAL

## 2024-08-07 ENCOUNTER — PATIENT OUTREACH (OUTPATIENT)
Dept: ADMINISTRATIVE | Facility: HOSPITAL | Age: 39
End: 2024-08-07
Payer: COMMERCIAL

## 2024-08-23 ENCOUNTER — HOSPITAL ENCOUNTER (EMERGENCY)
Facility: HOSPITAL | Age: 39
Discharge: HOME | End: 2024-08-23
Attending: EMERGENCY MEDICINE
Payer: COMMERCIAL

## 2024-08-23 VITALS
TEMPERATURE: 98 F | BODY MASS INDEX: 26.68 KG/M2 | RESPIRATION RATE: 15 BRPM | HEART RATE: 79 BPM | WEIGHT: 170 LBS | DIASTOLIC BLOOD PRESSURE: 70 MMHG | SYSTOLIC BLOOD PRESSURE: 119 MMHG | HEIGHT: 67 IN | OXYGEN SATURATION: 99 %

## 2024-08-23 DIAGNOSIS — R29.818 ACUTE FOCAL NEUROLOGICAL DEFICIT: ICD-10-CM

## 2024-08-23 DIAGNOSIS — G43.109 COMPLICATED MIGRAINE: ICD-10-CM

## 2024-08-23 DIAGNOSIS — R51.9 RIGHT-SIDED HEADACHE: Primary | ICD-10-CM

## 2024-08-23 LAB
ALBUMIN SERPL BCP-MCNC: 4 G/DL (ref 3.5–5.2)
ALP SERPL-CCNC: 76 U/L (ref 55–135)
ALT SERPL W/O P-5'-P-CCNC: 8 U/L (ref 10–44)
ANION GAP SERPL CALC-SCNC: 6 MMOL/L (ref 8–16)
AST SERPL-CCNC: 15 U/L (ref 10–40)
BASOPHILS # BLD AUTO: 0.08 K/UL (ref 0–0.2)
BASOPHILS NFR BLD: 0.7 % (ref 0–1.9)
BILIRUB SERPL-MCNC: 0.5 MG/DL (ref 0.1–1)
BUN SERPL-MCNC: 8 MG/DL (ref 6–20)
CALCIUM SERPL-MCNC: 9.2 MG/DL (ref 8.7–10.5)
CHLORIDE SERPL-SCNC: 110 MMOL/L (ref 95–110)
CHOLEST SERPL-MCNC: 205 MG/DL (ref 120–199)
CHOLEST/HDLC SERPL: 2.3 {RATIO} (ref 2–5)
CO2 SERPL-SCNC: 22 MMOL/L (ref 23–29)
CREAT SERPL-MCNC: 0.5 MG/DL (ref 0.5–1.4)
CREAT SERPL-MCNC: 0.7 MG/DL (ref 0.5–1.4)
DIFFERENTIAL METHOD BLD: ABNORMAL
EOSINOPHIL # BLD AUTO: 0 K/UL (ref 0–0.5)
EOSINOPHIL NFR BLD: 0.2 % (ref 0–8)
ERYTHROCYTE [DISTWIDTH] IN BLOOD BY AUTOMATED COUNT: 17.5 % (ref 11.5–14.5)
EST. GFR  (NO RACE VARIABLE): >60 ML/MIN/1.73 M^2
GLUCOSE SERPL-MCNC: 89 MG/DL (ref 70–110)
HCT VFR BLD AUTO: 33.1 % (ref 37–48.5)
HDLC SERPL-MCNC: 88 MG/DL (ref 40–75)
HDLC SERPL: 42.9 % (ref 20–50)
HGB BLD-MCNC: 9.9 G/DL (ref 12–16)
IMM GRANULOCYTES # BLD AUTO: 0.02 K/UL (ref 0–0.04)
IMM GRANULOCYTES NFR BLD AUTO: 0.2 % (ref 0–0.5)
INR PPP: 1 (ref 0.8–1.2)
LDLC SERPL CALC-MCNC: 105.6 MG/DL (ref 63–159)
LYMPHOCYTES # BLD AUTO: 2.8 K/UL (ref 1–4.8)
LYMPHOCYTES NFR BLD: 24.3 % (ref 18–48)
MCH RBC QN AUTO: 21 PG (ref 27–31)
MCHC RBC AUTO-ENTMCNC: 29.9 G/DL (ref 32–36)
MCV RBC AUTO: 70 FL (ref 82–98)
MONOCYTES # BLD AUTO: 1.1 K/UL (ref 0.3–1)
MONOCYTES NFR BLD: 9.8 % (ref 4–15)
NEUTROPHILS # BLD AUTO: 7.4 K/UL (ref 1.8–7.7)
NEUTROPHILS NFR BLD: 64.8 % (ref 38–73)
NONHDLC SERPL-MCNC: 117 MG/DL
NRBC BLD-RTO: 0 /100 WBC
OHS QRS DURATION: 78 MS
OHS QTC CALCULATION: 439 MS
PLATELET # BLD AUTO: 310 K/UL (ref 150–450)
PMV BLD AUTO: 9.5 FL (ref 9.2–12.9)
POC PTINR: 1.2 (ref 0.9–1.2)
POC PTWBT: 14.6 SEC (ref 9.7–14.3)
POCT GLUCOSE: 94 MG/DL (ref 70–110)
POTASSIUM SERPL-SCNC: 3.6 MMOL/L (ref 3.5–5.1)
PROT SERPL-MCNC: 7.5 G/DL (ref 6–8.4)
PROTHROMBIN TIME: 10.9 SEC (ref 9–12.5)
RBC # BLD AUTO: 4.72 M/UL (ref 4–5.4)
SAMPLE: ABNORMAL
SAMPLE: NORMAL
SODIUM SERPL-SCNC: 138 MMOL/L (ref 136–145)
TRIGL SERPL-MCNC: 57 MG/DL (ref 30–150)
TSH SERPL DL<=0.005 MIU/L-ACNC: 0.92 UIU/ML (ref 0.4–4)
WBC # BLD AUTO: 11.34 K/UL (ref 3.9–12.7)

## 2024-08-23 PROCEDURE — 82962 GLUCOSE BLOOD TEST: CPT

## 2024-08-23 PROCEDURE — 80053 COMPREHEN METABOLIC PANEL: CPT | Performed by: PHYSICIAN ASSISTANT

## 2024-08-23 PROCEDURE — 85610 PROTHROMBIN TIME: CPT | Performed by: PHYSICIAN ASSISTANT

## 2024-08-23 PROCEDURE — 63600175 PHARM REV CODE 636 W HCPCS: Performed by: EMERGENCY MEDICINE

## 2024-08-23 PROCEDURE — 99900035 HC TECH TIME PER 15 MIN (STAT)

## 2024-08-23 PROCEDURE — 25500020 PHARM REV CODE 255: Performed by: EMERGENCY MEDICINE

## 2024-08-23 PROCEDURE — 85025 COMPLETE CBC W/AUTO DIFF WBC: CPT | Performed by: PHYSICIAN ASSISTANT

## 2024-08-23 PROCEDURE — 93005 ELECTROCARDIOGRAM TRACING: CPT

## 2024-08-23 PROCEDURE — 82565 ASSAY OF CREATININE: CPT

## 2024-08-23 PROCEDURE — 93010 ELECTROCARDIOGRAM REPORT: CPT | Mod: ,,, | Performed by: INTERNAL MEDICINE

## 2024-08-23 PROCEDURE — 96361 HYDRATE IV INFUSION ADD-ON: CPT

## 2024-08-23 PROCEDURE — 96375 TX/PRO/DX INJ NEW DRUG ADDON: CPT

## 2024-08-23 PROCEDURE — 99285 EMERGENCY DEPT VISIT HI MDM: CPT | Mod: 25

## 2024-08-23 PROCEDURE — 85610 PROTHROMBIN TIME: CPT

## 2024-08-23 PROCEDURE — 25000003 PHARM REV CODE 250: Performed by: EMERGENCY MEDICINE

## 2024-08-23 PROCEDURE — 96374 THER/PROPH/DIAG INJ IV PUSH: CPT

## 2024-08-23 PROCEDURE — 84443 ASSAY THYROID STIM HORMONE: CPT | Performed by: PHYSICIAN ASSISTANT

## 2024-08-23 PROCEDURE — 80061 LIPID PANEL: CPT | Performed by: PHYSICIAN ASSISTANT

## 2024-08-23 PROCEDURE — 25000003 PHARM REV CODE 250: Performed by: PHYSICIAN ASSISTANT

## 2024-08-23 RX ORDER — DIPHENHYDRAMINE HYDROCHLORIDE 50 MG/ML
12.5 INJECTION INTRAMUSCULAR; INTRAVENOUS
Status: COMPLETED | OUTPATIENT
Start: 2024-08-23 | End: 2024-08-23

## 2024-08-23 RX ORDER — DEXAMETHASONE SODIUM PHOSPHATE 4 MG/ML
8 INJECTION, SOLUTION INTRA-ARTICULAR; INTRALESIONAL; INTRAMUSCULAR; INTRAVENOUS; SOFT TISSUE
Status: COMPLETED | OUTPATIENT
Start: 2024-08-23 | End: 2024-08-23

## 2024-08-23 RX ORDER — ACETAMINOPHEN 325 MG/1
650 TABLET ORAL
Status: COMPLETED | OUTPATIENT
Start: 2024-08-23 | End: 2024-08-23

## 2024-08-23 RX ORDER — PROCHLORPERAZINE EDISYLATE 5 MG/ML
5 INJECTION INTRAMUSCULAR; INTRAVENOUS
Status: COMPLETED | OUTPATIENT
Start: 2024-08-23 | End: 2024-08-23

## 2024-08-23 RX ADMIN — SODIUM CHLORIDE 1000 ML: 9 INJECTION, SOLUTION INTRAVENOUS at 09:08

## 2024-08-23 RX ADMIN — DEXAMETHASONE SODIUM PHOSPHATE 8 MG: 4 INJECTION INTRA-ARTICULAR; INTRALESIONAL; INTRAMUSCULAR; INTRAVENOUS; SOFT TISSUE at 09:08

## 2024-08-23 RX ADMIN — ACETAMINOPHEN 650 MG: 325 TABLET ORAL at 09:08

## 2024-08-23 RX ADMIN — PROCHLORPERAZINE EDISYLATE 5 MG: 5 INJECTION INTRAMUSCULAR; INTRAVENOUS at 09:08

## 2024-08-23 RX ADMIN — IOHEXOL 75 ML: 350 INJECTION, SOLUTION INTRAVENOUS at 08:08

## 2024-08-23 RX ADMIN — DIPHENHYDRAMINE HYDROCHLORIDE 12.5 MG: 50 INJECTION, SOLUTION INTRAMUSCULAR; INTRAVENOUS at 09:08

## 2024-08-23 NOTE — SUBJECTIVE & OBJECTIVE
History reviewed. No pertinent past medical history.  Past Surgical History:   Procedure Laterality Date    COLONOSCOPY N/A 6/13/2024    Procedure: COLONOSCOPY;  Surgeon: Melonie Lo MD;  Location: [facility] ([address] [place] [address]);  Service: Endoscopy;  Laterality: N/A;  Prep instructions sent via portal-dw  Peg Prep-dw  6/7-R/s Colonoscopy (and banding) WITH ME (Dr. Lo), prefers Thursday,  updated prep to portal. Patient ER visit 6/7 rx creams ASam  6/12-precall complete-Kpvt     Social History     Tobacco Use    Smoking status: Never    Smokeless tobacco: Never   Substance Use Topics    Alcohol use: No     Review of patient's allergies indicates:  No Known Allergies    Medications: I have reviewed the current medication administration record.    (Not in a hospital admission)      Review of Systems   Constitutional:  Negative for chills and fever.   HENT:  Negative for trouble swallowing.    Eyes:  Negative for visual disturbance.   Respiratory:  Negative for shortness of breath.    Cardiovascular:  Negative for chest pain.   Gastrointestinal:  Negative for nausea and vomiting.   Musculoskeletal:  Negative for neck pain and neck stiffness.   Neurological:  Positive for weakness, numbness and headaches. Negative for dizziness, tremors, seizures, syncope, facial asymmetry, speech difficulty and light-headedness.     Objective:     Vital Signs (Most Recent):  Temp: 98.5 °F (36.9 °C) (08/23/24 0807)  Pulse: 85 (08/23/24 0807)  Resp: 16 (08/23/24 0807)  BP: 137/82 (08/23/24 0807)  SpO2: 100 % (08/23/24 0807)    Vital Signs Range (Last 24H):  Temp:  [98.5 °F (36.9 °C)]   Pulse:  [85]   Resp:  [16]   BP: (137)/(82)   SpO2:  [100 %]        Physical Exam  Vitals and nursing note reviewed.   Constitutional:       General: She is in acute distress.      Appearance: Normal appearance. She is not ill-appearing.   HENT:      Head: Normocephalic and atraumatic.      Mouth/Throat:      Mouth: Mucous membranes are moist.       Pharynx: Oropharynx is clear.   Eyes:      General: No scleral icterus.     Extraocular Movements: Extraocular movements intact.      Conjunctiva/sclera: Conjunctivae normal.      Pupils: Pupils are equal, round, and reactive to light.   Cardiovascular:      Rate and Rhythm: Normal rate and regular rhythm.      Pulses: Normal pulses.   Pulmonary:      Effort: Pulmonary effort is normal. No respiratory distress.   Abdominal:      General: Abdomen is flat. There is no distension.   Musculoskeletal:         General: No swelling.      Cervical back: Normal range of motion and neck supple. No rigidity or tenderness.      Right lower leg: No edema.      Left lower leg: No edema.   Skin:     General: Skin is warm and dry.   Neurological:      Mental Status: She is alert.      Comments: See below for full neuro exam   Psychiatric:      Comments: Anxious, tearful              Neurological Exam:   LOC: alert  Attention Span: Good   Language: No aphasia  Articulation: No dysarthria  Orientation: Person, Place, Time   Visual Fields: Full  EOM (CN III, IV, VI): Full/intact  Pupils (CN II, III): PERRL  Facial Sensation (CN V): Normal  Facial Movement (CN VII): Symmetric facial expression    Motor: Arm left  Paresis: 4/5  Leg left  Normal 5/5  Arm right  Normal 5/5  Leg right Normal 5/5  Cerebellum: No evidence of appendicular or axial ataxia  Sensation: Intact to light touch, temperature and vibration      Laboratory:  CMP:   Recent Labs   Lab 08/23/24  0850   CALCIUM 9.2   ALBUMIN 4.0   PROT 7.5      K 3.6   CO2 22*      BUN 8   CREATININE 0.7   ALKPHOS 76   ALT 8*   AST 15   BILITOT 0.5     CBC:   Recent Labs   Lab 08/23/24  0850   WBC 11.34   RBC 4.72   HGB 9.9*   HCT 33.1*      MCV 70*   MCH 21.0*   MCHC 29.9*     Lipid Panel:   Recent Labs   Lab 08/23/24  0850   CHOL 205*   LDLCALC 105.6   HDL 88*   TRIG 57     Coagulation:   Recent Labs   Lab 08/23/24  0851   INR 1.2     Hgb A1C: No results for input(s):  ""HGBA1C" in the last 168 hours.  TSH:   Recent Labs   Lab 24  0850   TSH 0.924       Diagnostic Results:      Brain imagin24 CTH noncon  -No acute intracranial pathology.  If concern persists for acute ischemic event, consider MRI brain for further evaluation.    Vessel Imagin24 CTA stroke multiphase  -No acute intracranial pathology.  If concern persists for acute ischemic event, consider MRI brain for further evaluation.  -No high-grade stenosis or major vessel occlusion.    Cardiac Evaluation:   24 EKG  NSR    "

## 2024-08-23 NOTE — DISCHARGE INSTRUCTIONS
The CT scan and MRI of your brain do not show any signs of stroke. Please follow up with your primary doctor and neurology or return to the ER for any new or worsening symptoms.

## 2024-08-23 NOTE — CONSULTS
[facility] - Emergency Dept  Vascular Neurology  Comprehensive Stroke Center  Consult Note    Inpatient consult to Vascular (Stroke) Neurology  Consult performed by: Ramone Gonzalez MD  Consult ordered by: Ct Bowman PA-C  Reason for consult: stroke code        Assessment/Plan:     Patient is a 39 y.o. year old female with:    Headache  Ms. Mehta is a 39 year old female with no relevant diagnosed past medical history that was stroke coded after waking up with a headache and LUE weakness/numbness. NIHSS 1 for LUE drift. CTH negative for hem. CTA negative for LVO. Low concern for vascular etiology, more likely complex migraine type headache in the setting of recent increased stress/disrupted sleep. LDL is elevated to 105, would benefit from statin therapy for primary prevention.    Recommendations  - MRI Brain without contrast  - Treat headache with migraine cocktail  - Follow up with primary care/internal medicine for optimization of risk factors for primary prevention  - Follow up with general neurology for headache management  - I will follow up on MRI. If unremarkable, vascular neurology will sign off.        STROKE DOCUMENTATION     Acute Stroke Times   Last Known Normal Date: 08/23/24  Last Known Normal Time: 0300  Symptom Onset Date: 08/23/24  Symptom Onset Time: 0600  Stroke Team Called Date: 08/23/24  Stroke Team Called Time: 0842  Stroke Team Arrival Date: 08/23/24  Stroke Team Arrival Time: 0846  CT Interpretation Time: 0852  Thrombolytic Therapy Recommended: No  CTA Interpretation Time: 0856  Thrombectomy Recommended: No    NIH Scale:  1a. Level of Consciousness: 0-->Alert, keenly responsive  1b. LOC Questions: 0-->Answers both questions correctly  1c. LOC Commands: 0-->Performs both tasks correctly  2. Best Gaze: 0-->Normal  3. Visual: 0-->No visual loss  4. Facial Palsy: 0-->Normal symmetrical movements  5a. Motor Arm, Left: 1-->Drift, limb holds 90 (or 45) degrees, but drifts down before full 10  seconds, does not hit bed or other support  5b. Motor Arm, Right: 0-->No drift, limb holds 90 (or 45) degrees for full 10 secs  6a. Motor Leg, Left: 0-->No drift, leg holds 30 degree position for full 5 secs  6b. Motor Leg, Right: 0-->No drift, leg holds 30 degree position for full 5 secs  7. Limb Ataxia: 0-->Absent  8. Sensory: 0-->Normal, no sensory loss  9. Best Language: 0-->No aphasia, normal  10. Dysarthria: 0-->Normal  11. Extinction and Inattention (formerly Neglect): 0-->No abnormality  Total (NIH Stroke Scale): 1    Modified Bing Score: 1  Troutman Coma Scale:    ABCD2 Score:    [id number] Score:   HAS -BLED Score:   ICH Score:   Hunt & Vazquez Classification:       Thrombolysis Candidate? No, Non-disabling symptoms - Low NIHSS , Strong suspicion for stroke mimic or alternative diagnosis     Delays to Thrombolysis?  Not Applicable    Interventional Revascularization Candidate?   Is the patient eligible for mechanical endovascular reperfusion (PAO)?  No; No large vessel occlusion identified on imaging , No; no significant neurologic deficit (NIHSS <6) , and No; at this time symptoms not suggestive of large vessel occlusion    Delays to Thrombectomy? Not Applicable    Hemorrhagic change of an Ischemic Stroke: Does this patient have an ischemic stroke with hemorrhagic changes? No     Subjective:     History of Present Illness:  Ms. Mya Mehta is a 39 year old female with a no relevant past medical history that presents to [facility] ED after waking up with a R sided headache and LUE weakness/numbness. Stroke code was activated for these symptoms. She reports that she went to sleep at about 3 am night of 8/22/24 after having an argument with her partner. At 6am on 8/23, patient woke up with a R sided headache and LUE weakness/numbness. Before calling EMS and arriving to the ED, her L arm numbness resolved. Regarding her headache, she reports it was initially 10/10 intensity but was 8/10 by the time of my  interview. Pressing on the R sided of her head lessens the pain. She has intermittent, rare headaches but never one of this severity or character. She denies recent trauma. She denies other focal weakness, difficulty speaking, vision changes/photopsias, or gait disturbance.          History reviewed. No pertinent past medical history.  Past Surgical History:   Procedure Laterality Date    COLONOSCOPY N/A 6/13/2024    Procedure: COLONOSCOPY;  Surgeon: Melonie Lo MD;  Location: [facility] ([address] [place] [address]);  Service: Endoscopy;  Laterality: N/A;  Prep instructions sent via portal-dw  Peg Prep-dw  6/7-R/s Colonoscopy (and banding) WITH ME (Dr. Lo), prefers Thursday,  updated prep to portal. Patient ER visit 6/7 rx creams ASam  6/12-precall complete-Kpvt     Social History     Tobacco Use    Smoking status: Never    Smokeless tobacco: Never   Substance Use Topics    Alcohol use: No     Review of patient's allergies indicates:  No Known Allergies    Medications: I have reviewed the current medication administration record.    (Not in a hospital admission)      Review of Systems   Constitutional:  Negative for chills and fever.   HENT:  Negative for trouble swallowing.    Eyes:  Negative for visual disturbance.   Respiratory:  Negative for shortness of breath.    Cardiovascular:  Negative for chest pain.   Gastrointestinal:  Negative for nausea and vomiting.   Musculoskeletal:  Negative for neck pain and neck stiffness.   Neurological:  Positive for weakness, numbness and headaches. Negative for dizziness, tremors, seizures, syncope, facial asymmetry, speech difficulty and light-headedness.     Objective:     Vital Signs (Most Recent):  Temp: 98.5 °F (36.9 °C) (08/23/24 0807)  Pulse: 85 (08/23/24 0807)  Resp: 16 (08/23/24 0807)  BP: 137/82 (08/23/24 0807)  SpO2: 100 % (08/23/24 0807)    Vital Signs Range (Last 24H):  Temp:  [98.5 °F (36.9 °C)]   Pulse:  [85]   Resp:  [16]   BP: (137)/(82)   SpO2:  [100 %]         Physical Exam  Vitals and nursing note reviewed.   Constitutional:       General: She is in acute distress.      Appearance: Normal appearance. She is not ill-appearing.   HENT:      Head: Normocephalic and atraumatic.      Mouth/Throat:      Mouth: Mucous membranes are moist.      Pharynx: Oropharynx is clear.   Eyes:      General: No scleral icterus.     Extraocular Movements: Extraocular movements intact.      Conjunctiva/sclera: Conjunctivae normal.      Pupils: Pupils are equal, round, and reactive to light.   Cardiovascular:      Rate and Rhythm: Normal rate and regular rhythm.      Pulses: Normal pulses.   Pulmonary:      Effort: Pulmonary effort is normal. No respiratory distress.   Abdominal:      General: Abdomen is flat. There is no distension.   Musculoskeletal:         General: No swelling.      Cervical back: Normal range of motion and neck supple. No rigidity or tenderness.      Right lower leg: No edema.      Left lower leg: No edema.   Skin:     General: Skin is warm and dry.   Neurological:      Mental Status: She is alert.      Comments: See below for full neuro exam   Psychiatric:      Comments: Anxious, tearful              Neurological Exam:   LOC: alert  Attention Span: Good   Language: No aphasia  Articulation: No dysarthria  Orientation: Person, Place, Time   Visual Fields: Full  EOM (CN III, IV, VI): Full/intact  Pupils (CN II, III): PERRL  Facial Sensation (CN V): Normal  Facial Movement (CN VII): Symmetric facial expression    Motor: Arm left  Paresis: 4/5  Leg left  Normal 5/5  Arm right  Normal 5/5  Leg right Normal 5/5  Cerebellum: No evidence of appendicular or axial ataxia  Sensation: Intact to light touch, temperature and vibration      Laboratory:  CMP:   Recent Labs   Lab 08/23/24  0850   CALCIUM 9.2   ALBUMIN 4.0   PROT 7.5      K 3.6   CO2 22*      BUN 8   CREATININE 0.7   ALKPHOS 76   ALT 8*   AST 15   BILITOT 0.5     CBC:   Recent Labs   Lab 08/23/24  0850   WBC  "11.34   RBC 4.72   HGB 9.9*   HCT 33.1*      MCV 70*   MCH 21.0*   MCHC 29.9*     Lipid Panel:   Recent Labs   Lab 24  0850   CHOL 205*   LDLCALC 105.6   HDL 88*   TRIG 57     Coagulation:   Recent Labs   Lab 24  0851   INR 1.2     Hgb A1C: No results for input(s): "HGBA1C" in the last 168 hours.  TSH:   Recent Labs   Lab 24  0850   TSH 0.924       Diagnostic Results:      Brain imagin24 CTH noncon  -No acute intracranial pathology.  If concern persists for acute ischemic event, consider MRI brain for further evaluation.    Vessel Imagin24 CTA stroke multiphase  -No acute intracranial pathology.  If concern persists for acute ischemic event, consider MRI brain for further evaluation.  -No high-grade stenosis or major vessel occlusion.    Cardiac Evaluation:   24 EKG  NSR      Ramone Gonzalez MD  Comprehensive Stroke Center  Department of Vascular Neurology   [facility] - Emergency Dept   "

## 2024-08-23 NOTE — ED TRIAGE NOTES
Mya Mehta, a 39 y.o. female presents to the ED w/ complaint of 10/10 right side headache with tingling to left arm and leg, pt stated symptoms started this morning.     Triage note:  Chief Complaint   Patient presents with    Headache     Pt c/o headache this am.  C/O pressure type pain     Review of patient's allergies indicates:  No Known Allergies  No past medical history on file.

## 2024-08-23 NOTE — PROVIDER PROGRESS NOTES - EMERGENCY DEPT.
EKG interpretation by ED attending physician:  NSR with SA, rate 84, non-specific ST changes, no ST elevations or acute ischemia, normal intervals.  No prior for comparison.

## 2024-08-23 NOTE — HPI
Ms. Mya Mehta is a 39 year old female with a no relevant past medical history that [place] [facility] [place] after waking up with a R sided headache and LUE weakness/numbness. Stroke code was activated for these symptoms. She reports that she went to sleep at about 3 am night of 8/22/24 after having an argument with her partner. At 6am on 8/23, patient woke up with a R sided headache and LUE weakness/numbness. Before calling EMS and arriving to the ED, her L arm numbness resolved. Regarding her headache, she reports it was initially 10/10 intensity but was 8/10 by the time of my interview. Pressing on the R sided of her head lessens the pain. She has intermittent, rare headaches but never one of this severity or character. She denies recent trauma. She denies other focal weakness, difficulty speaking, vision changes/photopsias, or gait disturbance.  
Declined

## 2024-08-23 NOTE — ED PROVIDER NOTES
Encounter Date: 8/23/2024       History     Chief Complaint   Patient presents with    Headache     Pt c/o headache this am.  C/O pressure type pain     39-year-old female with no significant medical history presents to the emergency department with chief complaint of right sided headache that began acutely and suddenly at 6 AM this morning.  States that it feels like a pressure on the right side of her head.  Feels a little bit better when she presses on the area.  Denies any trauma or injury.  States that she was crying this morning due to the pain.  She denies unilateral tearing, rhinorrhea, neck pain, fever, nausea, vomiting, chest pain or shortness of breath.  States that she does not typically get headaches.  States that this feels different from any headache that she has had in the past.  She denies extremity weakness, numbness, tingling.  She did not take any medication prior to arrival.  She denies other worsening or alleviating factors.      Review of patient's allergies indicates:  No Known Allergies  History reviewed. No pertinent past medical history.  Past Surgical History:   Procedure Laterality Date    COLONOSCOPY N/A 6/13/2024    Procedure: COLONOSCOPY;  Surgeon: Melonie Lo MD;  Location: [address]);  Service: Endoscopy;  Laterality: N/A;  Prep instructions sent via portal-dw  Peg Prep-dw  6/7-R/s Colonoscopy (and banding) WITH ME (Dr. Lo), prefers Thursday,  updated prep to portal. Patient ER visit 6/7 rx creams ASam  6/12-precall complete-Kpvt     Family History   Problem Relation Name Age of Onset    Cancer Mother       Social History     Tobacco Use    Smoking status: Never    Smokeless tobacco: Never   Substance Use Topics    Alcohol use: No     Review of Systems   Neurological:  Positive for headaches.       Physical Exam     Initial Vitals [08/23/24 0807]   BP Pulse Resp Temp SpO2   137/82 85 16 98.5 °F (36.9 °C) 100 %      MAP       --         Physical Exam    Vitals  reviewed.  Constitutional: She appears well-developed and well-nourished. She is not diaphoretic. No distress.   HENT:   Head: Normocephalic and atraumatic.   Mouth/Throat: Oropharynx is clear and moist.   Eyes: Conjunctivae and EOM are normal. Pupils are equal, round, and reactive to light.   Neck: Neck supple.   Normal range of motion.  Cardiovascular:  Normal rate, regular rhythm, normal heart sounds and intact distal pulses.     Exam reveals no gallop and no friction rub.       No murmur heard.  Pulmonary/Chest: Breath sounds normal. She has no wheezes. She has no rhonchi. She has no rales.   Abdominal: Abdomen is soft. Bowel sounds are normal. There is no abdominal tenderness.   Musculoskeletal:         General: Normal range of motion.      Cervical back: Normal range of motion and neck supple.     Neurological: She is alert and oriented to person, place, and time. No cranial nerve deficit or sensory deficit. GCS score is 15. GCS eye subscore is 4. GCS verbal subscore is 5. GCS motor subscore is 6.   Slight left upper extremity and left lower extremity drift.  Strength 4/5 left upper and lower extremity compared to right. Sensation to light touch intact. Cranial nerves intact. 2+ distal pulses.    Skin: Skin is warm and dry. Capillary refill takes less than 2 seconds.   Psychiatric: Her behavior is normal. Judgment and thought content normal. Her mood appears anxious.   Tearful          ED Course   Procedures  Labs Reviewed   CBC W/ AUTO DIFFERENTIAL - Abnormal       Result Value    WBC 11.34      RBC 4.72      Hemoglobin 9.9 (*)     Hematocrit 33.1 (*)     MCV 70 (*)     MCH 21.0 (*)     MCHC 29.9 (*)     RDW 17.5 (*)     Platelets 310      MPV 9.5      Immature Granulocytes 0.2      Gran # (ANC) 7.4      Immature Grans (Abs) 0.02      Lymph # 2.8      Mono # 1.1 (*)     Eos # 0.0      Baso # 0.08      nRBC 0      Gran % 64.8      Lymph % 24.3      Mono % 9.8      Eosinophil % 0.2      Basophil % 0.7       Differential Method Automated     COMPREHENSIVE METABOLIC PANEL - Abnormal    Sodium 138      Potassium 3.6      Chloride 110      CO2 22 (*)     Glucose 89      BUN 8      Creatinine 0.7      Calcium 9.2      Total Protein 7.5      Albumin 4.0      Total Bilirubin 0.5      Alkaline Phosphatase 76      AST 15      ALT 8 (*)     eGFR >60.0      Anion Gap 6 (*)    LIPID PANEL - Abnormal    Cholesterol 205 (*)     Triglycerides 57      HDL 88 (*)     LDL Cholesterol 105.6      HDL/Cholesterol Ratio 42.9      Total Cholesterol/HDL Ratio 2.3      Non-HDL Cholesterol 117     ISTAT PROCEDURE - Abnormal    POC PTWBT 14.6 (*)     POC PTINR 1.2      Sample unknown     PROTIME-INR    Prothrombin Time 10.9      INR 1.0     TSH    TSH 0.924     POCT GLUCOSE, HAND-HELD DEVICE   POCT GLUCOSE    POCT Glucose 94     ISTAT CREATININE    POC Creatinine 0.5      Sample unknown          ECG Results              ECG 12 lead (Final result)        Collection Time Result Time QRS Duration OHS QTC Calculation    08/23/24 09:02:59 08/23/24 10:28:42 78 439                     Final result by Interface, Lab In [facility] (08/23/24 10:28:50)                   Narrative:    Test Reason : R29.818,    Vent. Rate : 084 BPM     Atrial Rate : 084 BPM     P-R Int : 132 ms          QRS Dur : 078 ms      QT Int : 372 ms       P-R-T Axes : 086 079 040 degrees     QTc Int : 439 ms    Normal sinus rhythm with sinus arrhythmia  Otherwise normal ECG    No previous ECGs available  Confirmed by Franky Medina MD ([id number]) on 8/23/2024 10:28:39 AM    Referred By: [email or web address]   SELF           Confirmed By:Franky Medina MD                                  Imaging Results              MRI Brain Without Contrast (Final result)  Result time 08/23/24 12:17:54      Final result by Stef Laughlin MD (08/23/24 12:17:54)                   Impression:      No acute intracranial pathology.      Electronically signed by: Stef Laughlin  Date:    08/23/2024  Time:    12:17                Narrative:    EXAMINATION:  MRI BRAIN WITHOUT CONTRAST    CLINICAL HISTORY:  Stroke, follow up;.    TECHNIQUE:  Multiplanar multisequence MR imaging of the brain was performed without contrast.    COMPARISON:  CTA stroke multiphase 08/23/2024    FINDINGS:  Intracranial compartment:    Ventricles and sulci are normal in size for age without evidence of hydrocephalus. No extra-axial blood or fluid collections.    The brain parenchyma appears normal. No mass lesion, acute hemorrhage, edema or acute infarct.    Normal vascular flow voids are preserved.    Skull/extracranial contents (limited evaluation): Bone marrow signal intensity is normal.                                       CTA STROKE MULTI-PHASE (Final result)  Result time 08/23/24 09:15:18      Final result by Stef Laughlin MD (08/23/24 09:15:18)                   Impression:      No acute intracranial pathology.  If concern persists for acute ischemic event, consider MRI brain for further evaluation.    No high-grade stenosis or major vessel occlusion.      Electronically signed by: Stef Laughlin  Date:    08/23/2024  Time:    09:15               Narrative:    EXAMINATION:  CTA STROKE MULTI-PHASE    CLINICAL HISTORY:  Neuro deficit, acute, stroke suspected;    TECHNIQUE:  Non contrast low dose axial images were obtained thought the head. CT angiogram was performed from the level of the sarai to the top of the head following the IV administration of 75mL of Omnipaque 350.   Sagittal and coronal reconstructions and maximum intensity projection reconstructions were performed. Arterial stenosis percentages are based on NASCET measurement criteria.  Two additional phases of immediate post-contrast CTA images were performed through the head alone.    COMPARISON:  None    FINDINGS:  Intracranial Compartment:    Ventricles and sulci are normal in size for age without evidence of hydrocephalus. No extra-axial blood or fluid collections.    The brain parenchyma appears  normal. No parenchymal mass, hemorrhage, edema, or major vascular distribution infarct.    Skull/Extracranial Contents (limited evaluation): No fracture. Mastoid air cells and paranasal sinuses are essentially clear.    Non-Vascular Structures of the Neck/Thoracic Inlet (limited evaluation): Normal.    Aorta: Normal 3 vessel arch.    Extracranial carotid circulation: No hemodynamically significant stenosis, aneurysmal dilatation, or dissection.    Extracranial vertebral circulation: No hemodynamically significant stenosis, aneurysmal dilatation, or dissection.    Intracranial Arteries: No focal high-grade stenosis, occlusion, or aneurysm.    Venous structures (limited evaluation): Normal.                                       Medications   iohexoL (OMNIPAQUE 350) injection 75 mL (75 mLs Intravenous Given 8/23/24 0859)   sodium chloride 0.9% bolus 1,000 mL 1,000 mL (0 mLs Intravenous Stopped 8/23/24 1041)   prochlorperazine injection Soln 5 mg (5 mg Intravenous Given 8/23/24 0944)   diphenhydrAMINE injection 12.5 mg (12.5 mg Intravenous Given 8/23/24 0940)   dexAMETHasone injection 8 mg (8 mg Intravenous Given 8/23/24 0940)   acetaminophen tablet 650 mg (650 mg Oral Given 8/23/24 0939)     Medical Decision Making  Emergent evaluation of a 39 y.o. female presenting to the emergency department complaining of right sided headache that started at 6AM. Denies extremities weakness, numbness, tingling, however does have left sided deficits on exam. Patient is afebrile, hemodynamically stable, and non toxic appearing.   Will call stroke code and discuss with vascular neurology.     Differential diagnosis includes but isn't limited to CVA, TIA, migraine headache, giant cell arteritis, cluster headache.    Patient presenting with right-sided headache that began at 6:00 a.m. this morning.  She arrives via EMS.  No medication prior to arrival.  She denies extremity weakness, numbness, tingling.  However, on exam, she does have a  very slight left-sided drift of the upper and lower extremity.  Strength decreased on the left compared to right.  Sensation to light touch intact.    No leukocytosis.  H/H of 9.9/33.1.  Platelets are normal.  No severe metabolic derangements.  TSH within normal limits.  CTA and MRI brain without acute abnormality.    After given a migraine cocktail, patient reports improvement of her symptoms.  She no longer has a headache.  Suspect this may have been a complex migraine.  Will discharge with outpatient follow-up with PCP and neurology.  Referral placed.  Return precautions given.  All questions answered.  The patient was instructed to follow up with a primary care provider and neurology or to return to the emergency department for worsening symptoms. The treatment plan was discussed with the patient who demonstrated understanding and comfort with plan. The patient's history, physical exam, and plan of care was discussed with and agreed upon with my supervising physician.       Amount and/or Complexity of Data Reviewed  Labs: ordered.  Radiology: ordered. Decision-making details documented in ED Course.    Risk  OTC drugs.  Prescription drug management.               ED Course as of 08/23/24 1300   Fri Aug 23, 2024   0859 Patient is a 39 y.o. female presenting to ED with symptoms concerning for possible acute stroke. Symptoms started at 06:00 this AM.  On arrival, physical exam with LUE and LLE weakness, mild drift.  Code stroke activated, and patient was taken directly to CT.    Plan to obtain EKG, labs, and discuss with Vascular Neurology for further recommendations.     [SS]   0909 CTA STROKE MULTI-PHASE  CT head independently interpreted: no intracranial hemorrhage, mass effect, or midline shift.  Agree with radiologist interpretation.    [SS]      ED Course User Index  [SS] Cody Parker MD                             Clinical Impression:  Final diagnoses:  [R29.818] Acute focal neurological deficit  [R51.9]  Right-sided headache (Primary)          ED Disposition Condition    Discharge Stable          ED Prescriptions    None       Follow-up Information       Follow up With Specialties Details Why Contact Info Additional Information    [facility] - Emergency Dept Emergency Medicine Go to  If symptoms worsen [address]  [facility] [address] [phone]     Josaih Gabriel MD Family Medicine Schedule an appointment as soon as possible for a visit   [address] [address] [phone]       [facility] - Neurology [facility] Neurology Schedule an appointment as soon as possible for a visit   [address]  [facility] [address] [phone] Neuroscience [place] [facility], 7th Floor Please park in [facility] and take Clinic elevator             Ct Bowman PA-C  08/23/24 1302

## 2024-08-23 NOTE — ASSESSMENT & PLAN NOTE
Ms. Mehta is a 39 year old female with no relevant diagnosed past medical history that was stroke coded after waking up with a headache and LUE weakness/numbness. NIHSS 1 for LUE drift. CTH negative for hem. CTA negative for LVO. Low concern for vascular etiology, more likely complex migraine type headache in the setting of recent increased stress/disrupted sleep. LDL is elevated to 105, would benefit from statin therapy for primary prevention.    Recommendations  - MRI Brain without contrast  - Treat headache with migraine cocktail  - Follow up with primary care/internal medicine for optimization of risk factors for primary prevention  - Follow up with general neurology for headache management  - I will follow up on MRI. If unremarkable, vascular neurology will sign off.

## 2024-08-23 NOTE — PROVIDER PROGRESS NOTES - EMERGENCY DEPT.
Encounter Date: 8/23/2024    ED Physician Progress Notes            Critical Care    Date/Time: 8/23/2024 9:03 AM    Performed by: Cody Parker MD  Authorized by: Cody Parker MD  Total critical care time (exclusive of procedural time) : 45 minutes  Critical care was necessary to treat or prevent imminent or life-threatening deterioration of the following conditions: focal neurologic deficit.

## 2024-09-03 NOTE — PROGRESS NOTES
CRS Office Visit History and Physical      SUBJECTIVE:     Chief Complaint: Hemorrhoids    History of Present Illness:  Patient is a 39 y.o. female presents for hemorrhoid follow-up.  Pain is a little better after banding. Had CSY + banding on 6/13/2024. Still has sit soak in warm tub after Bms.    (6/5/2024)  Symptoms have been present for several years.  Previously worked from home and would deal with it (Ibuprofen, lying down). Flares happened during her cycle and would last 2-3 days.  Now has an in-person job.  Most recent episode has lasted for 1 month.  Pain is mainly during/after a BM.  Has tried Prep H cream.  Associated bleeding: yes    Prior colonoscopy: No  Prior abdominal surgery: No  Prior pelvic or anorectal surgery: No  Family history of colon/rectal cancer: No  Family history of IBD: No  Steroid or other immunosuppression: No  Blood thinners: No  Current stool softeners or fiber supplement: No  Active smoking: No  Prior deliveries: Yes - 2  complicated by tear: No    [place] (FI):  Total: 0    Altomare (ODS):  How long on toilet:   <5min (0)   How many times/day:  1 (0)   Digitation:   Never (0)   Laxatives:   Never (0)   Enemas:    Never (0)  Incomplete stool:  Never (0)   Strain:    <25% (1)   Total: 1    Bowel movements per month:   Few times per week   Leakage of urine: No  Trouble emptying your bladder: No  Feel something bulging through vagina? No        Review of patient's allergies indicates:  No Known Allergies    No past medical history on file.  Past Surgical History:   Procedure Laterality Date    COLONOSCOPY N/A 6/13/2024    Procedure: COLONOSCOPY;  Surgeon: Melonie Lo MD;  Location: [address]);  Service: Endoscopy;  Laterality: N/A;  Prep instructions sent via portal-dw  Peg Prep-dw  6/7-R/s Colonoscopy (and banding) WITH ME (Dr. Lo), prefers Thursday,  updated prep to portal. Patient ER visit 6/7 rx creams ASam  6/12-precall complete-Kpvt     Family History  "  Problem Relation Name Age of Onset    Cancer Mother       Social History     Tobacco Use    Smoking status: Never    Smokeless tobacco: Never   Substance Use Topics    Alcohol use: No        Review of Systems:  ROS    OBJECTIVE:     Vital Signs (Most Recent)  BP (!) 124/91 (BP Location: Left arm, Patient Position: Sitting)   Pulse 76   Ht 5' 7.01" (1.702 m)   Wt 75.2 kg (165 lb 12.6 oz)   BMI 25.96 kg/m²     Physical Exam:  General: Black or  female in no distress   Neuro: Alert and oriented x 4.  Moves all extremities.     HEENT: No icterus.  Trachea midline  Respiratory: Respirations are even and unlabored  Cardiac: Regular rate  Extremities: Warm dry and intact  Skin: No rashes     Patient was examined w/ a chaperone present.    Anorectal:   External exam: Posterior midline fissure seen today on exam      ASSESSMENT/PLAN:     40yo F w/ anal pain, fissure    The patient was instructed to:  Increase water intake to at least 8-10 glasses of water per day.  Take a daily fiber supplement (Konsyl, Benefiber, Metamucil) and increase dietary intake to 20-30 grams/day.  Avoid straining or spending >5min/event with bowel movements.  Diltiazem 2% TID  RTC 6-8 weeks    Melonie Lo MD  Staff Surgeon, Colon and Rectal Surgery  [facility]      "

## 2024-09-04 ENCOUNTER — OFFICE VISIT (OUTPATIENT)
Dept: SURGERY | Facility: CLINIC | Age: 39
End: 2024-09-04
Attending: COLON & RECTAL SURGERY
Payer: COMMERCIAL

## 2024-09-04 VITALS
BODY MASS INDEX: 26.02 KG/M2 | HEART RATE: 76 BPM | DIASTOLIC BLOOD PRESSURE: 91 MMHG | HEIGHT: 67 IN | WEIGHT: 165.81 LBS | SYSTOLIC BLOOD PRESSURE: 124 MMHG

## 2024-09-04 DIAGNOSIS — K60.2 ANAL FISSURE: ICD-10-CM

## 2024-09-04 DIAGNOSIS — K64.9 HEMORRHOIDS, UNSPECIFIED HEMORRHOID TYPE: Primary | ICD-10-CM

## 2024-09-04 PROCEDURE — 99214 OFFICE O/P EST MOD 30 MIN: CPT | Mod: S$GLB,,, | Performed by: COLON & RECTAL SURGERY

## 2024-09-04 PROCEDURE — 1159F MED LIST DOCD IN RCRD: CPT | Mod: CPTII,S$GLB,, | Performed by: COLON & RECTAL SURGERY

## 2024-09-04 PROCEDURE — 1160F RVW MEDS BY RX/DR IN RCRD: CPT | Mod: CPTII,S$GLB,, | Performed by: COLON & RECTAL SURGERY

## 2024-09-04 PROCEDURE — 3080F DIAST BP >= 90 MM HG: CPT | Mod: CPTII,S$GLB,, | Performed by: COLON & RECTAL SURGERY

## 2024-09-04 PROCEDURE — 3008F BODY MASS INDEX DOCD: CPT | Mod: CPTII,S$GLB,, | Performed by: COLON & RECTAL SURGERY

## 2024-09-04 PROCEDURE — 99999 PR PBB SHADOW E&M-EST. PATIENT-LVL III: CPT | Mod: PBBFAC,,, | Performed by: COLON & RECTAL SURGERY

## 2024-09-04 PROCEDURE — 3074F SYST BP LT 130 MM HG: CPT | Mod: CPTII,S$GLB,, | Performed by: COLON & RECTAL SURGERY

## 2024-09-13 ENCOUNTER — PATIENT MESSAGE (OUTPATIENT)
Dept: PRIMARY CARE CLINIC | Facility: CLINIC | Age: 39
End: 2024-09-13
Payer: COMMERCIAL

## 2024-10-01 ENCOUNTER — PATIENT MESSAGE (OUTPATIENT)
Dept: PRIMARY CARE CLINIC | Facility: CLINIC | Age: 39
End: 2024-10-01
Payer: COMMERCIAL

## 2024-11-13 ENCOUNTER — PATIENT MESSAGE (OUTPATIENT)
Dept: SURGERY | Facility: CLINIC | Age: 39
End: 2024-11-13
Payer: COMMERCIAL

## 2024-11-27 ENCOUNTER — TELEPHONE (OUTPATIENT)
Dept: SURGERY | Facility: CLINIC | Age: 39
End: 2024-11-27
Payer: COMMERCIAL

## 2024-12-09 ENCOUNTER — PATIENT MESSAGE (OUTPATIENT)
Dept: SURGERY | Facility: CLINIC | Age: 39
End: 2024-12-09
Payer: COMMERCIAL

## 2024-12-10 ENCOUNTER — PATIENT MESSAGE (OUTPATIENT)
Dept: PRIMARY CARE CLINIC | Facility: CLINIC | Age: 39
End: 2024-12-10
Payer: COMMERCIAL

## 2024-12-20 ENCOUNTER — OFFICE VISIT (OUTPATIENT)
Dept: PRIMARY CARE CLINIC | Facility: CLINIC | Age: 39
End: 2024-12-20
Payer: COMMERCIAL

## 2024-12-20 DIAGNOSIS — K64.2 GRADE III HEMORRHOIDS: Primary | ICD-10-CM

## 2024-12-20 RX ORDER — HYDROCODONE BITARTRATE AND ACETAMINOPHEN 5; 325 MG/1; MG/1
1 TABLET ORAL EVERY 6 HOURS PRN
Qty: 28 TABLET | Refills: 0 | Status: SHIPPED | OUTPATIENT
Start: 2024-12-20

## 2024-12-20 NOTE — LETTER
December 20, 2024    Mya GILES Janine  [address]  [facility] [address]             [facility] [id number]  [facility] [address]  [facility] [address]  Phone: [phone]  Fax: [phone]   To whom it May concern,     The above patient date of birth 1985 is currently under my care and was seen in my office on the above date.  The  patient is currently under my care for several medical issues which interfere with her sitting for prolonged periods of time during flares.  Please allow for her the option of working from home should these flares to avoid any complications or discomfort that is unnecessary.  The patient continues under my care in under the care of specialist for management of this condition.  Any accommodations for the patient within the next 6 months to allow for her to work from home only during flares would be appreciated.  Please feel free to call my clinic  for any questions or concerns.        Respectfully,       ALIREZA Bowden, BC      Mohs Histo Method Verbiage: Each section was then chromacoded and processed in the Mohs lab using the Mohs protocol and submitted for frozen section.

## 2024-12-20 NOTE — PROGRESS NOTES
"Assessment:       1. Grade III hemorrhoids         Plan:       Grade III hemorrhoids  -     Ambulatory referral/consult to Colorectal Surgery; Future; Expected date: 12/27/2024  -     HYDROcodone-acetaminophen (NORCO) 5-325 mg per tablet; Take 1 tablet by mouth every 6 (six) hours as needed for Pain.  Dispense: 28 tablet; Refill: 0      Assessment & Plan    IMPRESSION:  - Reviewed patient's chart and recent colonoscopy results, recently underwent "banding" on for grade 3 hemorrhoids.  - Acknowledged ongoing flare-ups despite some improvement post-  Banding  - Considered patient's request for pain management and work  accommodation is due to the pain recurrent flares from the hemorrhoids.  - Assessed need for second opinion given Dr. Lo  limited treatment options  - Evaluated severity of hemorrhoids, noted as grade 3    HEMORRHOIDS:  - Explained that topical treatments for hemorrhoids may not provide immediate relief.  - Mya to continue dietary modifications to manage symptoms.  - Mya to elevate feet during flare-ups when possible.  - Started Norco for pain management during flare-ups (not for daily use).  - Discontinued Motrin due to potential side effects.  - Continued OTC Tylenol for pain management as needed.  - Referred to Dr. Edwin La, colorectal surgeon in [place], for second opinion.    DIETARY COUNSELING:  - Mya to continue dietary modifications to manage symptoms.  - Discussed potential negative effects of excessive Motrin use, including constipation and stomach issues.    CHANGE OF JOB / WORK ACCOMMODATIONS:  - Follow up in 6 months for reassessment of work accommodations.    GENERAL FOLLOW-UP:  - Contact the office if symptoms worsen or new concerns arise.       Medication List with Changes/Refills   New Medications    HYDROCODONE-ACETAMINOPHEN (NORCO) 5-325 MG PER TABLET    Take 1 tablet by mouth every 6 (six) hours as needed for Pain.   Current Medications    ADAPALENE 0.3 % GEL  "   Apply topically every evening. Apply thin layer to all affected areas nightly. Start slow, up titrate as tolerated.    DIAZEPAM (VALIUM) 5 MG TABLET    1 po q.d. p.r.n. anxiety    DILTIAZEM 2% RECTAL CREAM    Apply topically 3 (three) times daily. Apply topically to anal area. Use a pea-sized amount.  Apply to the OUTER [place] of your anus.  Do not insert into the anus. This compound expires after 30 days.    PHENYLEPHRINE-COCOA BUTTER 0.25-88.44 % SUPP SUPPOSITORY    Place 1 suppository rectally 4 (four) times daily as needed (hemorroid pain).    POLYETHYLENE GLYCOL (GLYCOLAX) 17 GRAM PWPK    Take 17 g by mouth daily as needed for Constipation (to soften stool). Take as much as needed to soften stool to a soft serve ice cream consistency, up to 1 pack per day.    PRAMOXINE-HYDROCORTISONE (PROCTOCREAM-HC) 1-1 % RECTAL CREAM    Place rectally 3 (three) times daily.    TRETINOIN (RETIN-A) 0.1 % CREAM    Apply topically every evening.   Discontinued Medications    HYDROCODONE-ACETAMINOPHEN (NORCO) 5-325 MG PER TABLET    Take 1 tablet by mouth every 6 (six) hours as needed for Pain.         Subjective:    Patient ID: Mya Mehta is a 39 y.o. female.  Chief Complaint: No chief complaint on file.    History of Present Illness    CHIEF COMPLAINT:  Mya presents today for follow-up regarding frequent flare-ups and pain management.    HEMORRHOIDS AND GI SYMPTOMS:  She has grade three hemorrhoids with frequent flare-ups that persist despite previous colonoscopy. During flare-ups, she needs to lay down and elevate her feet, which impacts her work ability even with a hybrid schedule. She has tried topical treatments without immediate relief. She avoids eating to prevent pain. Flares occur randomly as she is unable to control of flares from.  Described as painful, unable to sit.  Unable to ride in car.  Unable to sit irregular seat.  Often has to lay down and work lying down.  Under the care of Colorectal surgery  but they have told her that there there was not much else that they can do to alleviate her pain.    DIET MODIFICATIONS:  She has eliminated milk and cheese from her diet but continues to experience severe flare-ups.    PAIN MANAGEMENT:  She currently uses OTC Tylenol for pain management. She was advised against using Motrin due to potential side effects.   Requesting opiate for pain treatment   For  flares      Review of Systems   Constitutional:  Negative for chills and fever.   HENT:  Negative for ear pain, nosebleeds, sinus pressure and sore throat.    Respiratory:  Negative for cough and shortness of breath.    Cardiovascular:  Negative for chest pain and palpitations.   Gastrointestinal:  Positive for anal bleeding (rectal pain) and rectal pain. Negative for diarrhea, nausea and vomiting.   Genitourinary: Negative.    Psychiatric/Behavioral:  Negative for dysphoric mood and sleep disturbance. The patient is not nervous/anxious.        Objective:      There were no vitals filed for this visit.  BP Readings from Last 5 Encounters:   09/04/24 (!) 124/91   08/23/24 119/70   06/13/24 123/83   06/07/24 (!) 154/92   06/05/24 125/86     Wt Readings from Last 5 Encounters:   09/04/24 75.2 kg (165 lb 12.6 oz)   08/23/24 77.1 kg (170 lb)   06/13/24 77.1 kg (170 lb)   06/07/24 77.1 kg (170 lb)   06/05/24 78.7 kg (173 lb 8 oz)     Physical Exam  Constitutional:       General: She is not in acute distress.     Appearance: She is obese. She is not ill-appearing.   HENT:      Head: Normocephalic.   Pulmonary:      Effort: Pulmonary effort is normal.   Neurological:      Mental Status: She is alert.   Psychiatric:         Mood and Affect: Mood normal.         Behavior: Behavior normal.         Thought Content: Thought content normal.         Judgment: Judgment normal.           Lab Results   Component Value Date    WBC 11.34 08/23/2024    HGB 9.9 (L) 08/23/2024    HCT 33.1 (L) 08/23/2024     08/23/2024    CHOL 205 (H)  08/23/2024    TRIG 57 08/23/2024    HDL 88 (H) 08/23/2024    ALT 8 (L) 08/23/2024    AST 15 08/23/2024     08/23/2024    K 3.6 08/23/2024     08/23/2024    CREATININE 0.7 08/23/2024    BUN 8 08/23/2024    CO2 22 (L) 08/23/2024    TSH 0.924 08/23/2024    INR 1.2 08/23/2024      This note was generated with the assistance of ambient listening technology. Verbal consent was obtained by the patient and accompanying visitor(s) for the recording of patient appointment to facilitate this note. I attest to having reviewed and edited the generated note for accuracy, though some syntax or spelling errors may persist. Please contact the author of this note for any clarification.

## 2025-01-17 ENCOUNTER — PATIENT MESSAGE (OUTPATIENT)
Dept: PRIMARY CARE CLINIC | Facility: CLINIC | Age: 40
End: 2025-01-17
Payer: COMMERCIAL

## 2025-01-24 NOTE — TELEPHONE ENCOUNTER
Refill Routing Note   Medication(s) are not appropriate for processing by [facility] for the following reason(s):        Outside of protocol    [facility] action(s):  Route               Appointments  past 12m or future 3m with PCP    Date Provider   Last Visit   3/12/2024 Josiah Gabriel MD   Next Visit   Visit date not found Josiah Gabriel MD   ED visits in past 90 days: 0        Note composed:4:33 PM 01/24/2025

## 2025-01-25 RX ORDER — METRONIDAZOLE 7.5 MG/G
1 GEL VAGINAL
Qty: 70 G | Refills: 2 | Status: SHIPPED | OUTPATIENT
Start: 2025-01-25

## 2025-01-26 RX ORDER — TRETINOIN 1 MG/G
CREAM TOPICAL NIGHTLY
Qty: 45 G | Refills: 5 | Status: SHIPPED | OUTPATIENT
Start: 2025-01-26

## 2025-01-31 ENCOUNTER — TELEPHONE (OUTPATIENT)
Dept: SURGERY | Facility: CLINIC | Age: 40
End: 2025-01-31
Payer: COMMERCIAL

## 2025-01-31 NOTE — TELEPHONE ENCOUNTER
Patient did not check in for virtual visit this morning.  Attempted to call, no answer.    Melonie Lo

## 2025-02-20 ENCOUNTER — E-VISIT (OUTPATIENT)
Dept: PRIMARY CARE CLINIC | Facility: CLINIC | Age: 40
End: 2025-02-20
Payer: COMMERCIAL

## 2025-02-20 ENCOUNTER — NURSE TRIAGE (OUTPATIENT)
Dept: ADMINISTRATIVE | Facility: CLINIC | Age: 40
End: 2025-02-20
Payer: COMMERCIAL

## 2025-02-20 DIAGNOSIS — M54.50 ACUTE MIDLINE LOW BACK PAIN WITHOUT SCIATICA: Primary | ICD-10-CM
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
## 2025-02-21 ENCOUNTER — OFFICE VISIT (OUTPATIENT)
Dept: PRIMARY CARE CLINIC | Facility: CLINIC | Age: 40
End: 2025-02-21
Payer: COMMERCIAL

## 2025-02-21 DIAGNOSIS — K64.2 GRADE III HEMORRHOIDS: ICD-10-CM
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
## 2025-02-21 DIAGNOSIS — S29.019A STRAIN OF THORACIC SPINE, INITIAL ENCOUNTER: ICD-10-CM

## 2025-02-21 PROCEDURE — 1160F RVW MEDS BY RX/DR IN RCRD: CPT | Mod: CPTII,95,, | Performed by: INTERNAL MEDICINE

## 2025-02-21 PROCEDURE — 1159F MED LIST DOCD IN RCRD: CPT | Mod: CPTII,95,, | Performed by: INTERNAL MEDICINE

## 2025-02-21 PROCEDURE — 98005 SYNCH AUDIO-VIDEO EST LOW 20: CPT | Mod: 95,,, | Performed by: INTERNAL MEDICINE

## 2025-02-21 RX ORDER — MELOXICAM 15 MG/1
15 TABLET ORAL DAILY
Qty: 30 TABLET | Refills: 0 | Status: SHIPPED | OUTPATIENT
Start: 2025-02-21

## 2025-02-21 RX ORDER — HYDROCODONE BITARTRATE AND ACETAMINOPHEN 5; 325 MG/1; MG/1
1 TABLET ORAL EVERY 6 HOURS PRN
Qty: 20 TABLET | Refills: 0 | Status: SHIPPED | OUTPATIENT
Start: 2025-02-21

## 2025-02-21 RX ORDER — TIZANIDINE 4 MG/1
4 TABLET ORAL 2 TIMES DAILY PRN
Qty: 30 TABLET | Refills: 0 | Status: SHIPPED | OUTPATIENT
Start: 2025-02-21

## 2025-02-23 RX ORDER — CYCLOBENZAPRINE HCL 10 MG
10 TABLET ORAL 3 TIMES DAILY PRN
Qty: 30 TABLET | Refills: 0 | Status: SHIPPED | OUTPATIENT
Start: 2025-02-23 | End: 2025-03-05
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
## 2025-03-05 PROBLEM — S29.012A STRAIN OF THORACIC SPINE: Status: ACTIVE | Noted: 2025-03-05

## 2025-03-23 DIAGNOSIS — S29.019A STRAIN OF THORACIC SPINE, INITIAL ENCOUNTER: ICD-10-CM

## 2025-03-23 NOTE — TELEPHONE ENCOUNTER
No care due was identified.  [facility] Embedded Care Due Messages. Reference number: [id number].   3/23/2025 6:57:05 AM CDT

## 2025-03-25 RX ORDER — MELOXICAM 15 MG/1
15 TABLET ORAL
Qty: 30 TABLET | Refills: 5 | Status: SHIPPED | OUTPATIENT
Start: 2025-03-25

## 2025-04-11 ENCOUNTER — PATIENT OUTREACH (OUTPATIENT)
Dept: ADMINISTRATIVE | Facility: HOSPITAL | Age: 40
End: 2025-04-11
Payer: COMMERCIAL

## 2025-04-11 DIAGNOSIS — Z12.31 BREAST CANCER SCREENING BY MAMMOGRAM: Primary | ICD-10-CM

## 2025-04-11 NOTE — PROGRESS NOTES
Health Maintenance Due   Topic Date Due    TETANUS VACCINE  Never done    Mammogram  Never done    Hemoglobin A1c (Diabetic Prevention Screening)  Never done    Influenza Vaccine (1) Never done    COVID-19 Vaccine (2 - 2024-25 season) 09/01/2024     Immunizations - reviewed and updated   Care Everywhere - triggered   Care Teams - updated   Outreach - Mammogram gap report reviewed. Order placed. Portal message sent in regards to scheduling

## 2025-06-23 ENCOUNTER — HOSPITAL ENCOUNTER (OUTPATIENT)
Dept: RADIOLOGY | Facility: HOSPITAL | Age: 40
Discharge: HOME | End: 2025-06-23
Attending: FAMILY MEDICINE
Payer: COMMERCIAL

## 2025-06-23 DIAGNOSIS — Z12.31 BREAST CANCER SCREENING BY MAMMOGRAM: ICD-10-CM

## 2025-06-23 PROCEDURE — 77067 SCR MAMMO BI INCL CAD: CPT | Mod: 26,,, | Performed by: RADIOLOGY

## 2025-06-23 PROCEDURE — 77063 BREAST TOMOSYNTHESIS BI: CPT | Mod: 26,,, | Performed by: RADIOLOGY

## 2025-06-23 PROCEDURE — 77067 SCR MAMMO BI INCL CAD: CPT | Mod: TC

## 2025-06-24 ENCOUNTER — PATIENT MESSAGE (OUTPATIENT)
Dept: HEMATOLOGY/ONCOLOGY | Facility: CLINIC | Age: 40
End: 2025-06-24
Payer: COMMERCIAL

## 2025-06-28 ENCOUNTER — RESULTS FOLLOW-UP (OUTPATIENT)
Dept: PRIMARY CARE CLINIC | Facility: CLINIC | Age: 40
End: 2025-06-28
Payer: COMMERCIAL

## 2025-07-07 ENCOUNTER — PATIENT MESSAGE (OUTPATIENT)
Dept: PRIMARY CARE CLINIC | Facility: CLINIC | Age: 40
End: 2025-07-07
Payer: COMMERCIAL

## 2025-08-24 ENCOUNTER — ON-DEMAND VIRTUAL (OUTPATIENT)
Dept: URGENT CARE | Facility: CLINIC | Age: 40
End: 2025-08-24
Payer: COMMERCIAL

## 2025-08-24 DIAGNOSIS — U07.1 COVID-19: Primary | ICD-10-CM

## 2025-08-24 PROCEDURE — 98005 SYNCH AUDIO-VIDEO EST LOW 20: CPT | Mod: 95,,, | Performed by: NURSE PRACTITIONER

## 2025-08-24 RX ORDER — PROMETHAZINE HYDROCHLORIDE AND DEXTROMETHORPHAN HYDROBROMIDE 6.25; 15 MG/5ML; MG/5ML
5 SYRUP ORAL NIGHTLY PRN
Qty: 120 ML | Refills: 0 | Status: SHIPPED | OUTPATIENT
Start: 2025-08-24

## 2025-08-24 RX ORDER — NIRMATRELVIR AND RITONAVIR 300-100 MG
KIT ORAL
Qty: 30 TABLET | Refills: 0 | Status: SHIPPED | OUTPATIENT
Start: 2025-08-24 | End: 2025-08-29

## 2025-08-24 RX ORDER — FLUTICASONE PROPIONATE 50 MCG
2 SPRAY, SUSPENSION (ML) NASAL DAILY
Qty: 16 G | Refills: 0 | Status: SHIPPED | OUTPATIENT
Start: 2025-08-24